# Patient Record
Sex: MALE | Race: WHITE | NOT HISPANIC OR LATINO | Employment: FULL TIME | URBAN - NONMETROPOLITAN AREA
[De-identification: names, ages, dates, MRNs, and addresses within clinical notes are randomized per-mention and may not be internally consistent; named-entity substitution may affect disease eponyms.]

---

## 2019-12-24 ENCOUNTER — OFFICE VISIT (OUTPATIENT)
Dept: URGENT CARE | Facility: PHYSICIAN GROUP | Age: 59
End: 2019-12-24
Payer: COMMERCIAL

## 2019-12-24 ENCOUNTER — APPOINTMENT (OUTPATIENT)
Dept: RADIOLOGY | Facility: IMAGING CENTER | Age: 59
End: 2019-12-24
Attending: PHYSICIAN ASSISTANT
Payer: COMMERCIAL

## 2019-12-24 VITALS
SYSTOLIC BLOOD PRESSURE: 132 MMHG | RESPIRATION RATE: 16 BRPM | TEMPERATURE: 97.8 F | OXYGEN SATURATION: 98 % | HEART RATE: 76 BPM | WEIGHT: 249 LBS | BODY MASS INDEX: 33.77 KG/M2 | DIASTOLIC BLOOD PRESSURE: 76 MMHG

## 2019-12-24 DIAGNOSIS — S69.91XA INJURY OF RIGHT HAND, INITIAL ENCOUNTER: ICD-10-CM

## 2019-12-24 DIAGNOSIS — Z23 NEED FOR VACCINATION: ICD-10-CM

## 2019-12-24 DIAGNOSIS — S63.501A SPRAIN OF RIGHT WRIST, INITIAL ENCOUNTER: Primary | ICD-10-CM

## 2019-12-24 PROCEDURE — 73110 X-RAY EXAM OF WRIST: CPT | Mod: TC,FY,RT | Performed by: PHYSICIAN ASSISTANT

## 2019-12-24 PROCEDURE — 90471 IMMUNIZATION ADMIN: CPT | Performed by: PHYSICIAN ASSISTANT

## 2019-12-24 PROCEDURE — 73130 X-RAY EXAM OF HAND: CPT | Mod: TC,FY,RT | Performed by: PHYSICIAN ASSISTANT

## 2019-12-24 PROCEDURE — 99204 OFFICE O/P NEW MOD 45 MIN: CPT | Mod: 25 | Performed by: PHYSICIAN ASSISTANT

## 2019-12-24 PROCEDURE — 90686 IIV4 VACC NO PRSV 0.5 ML IM: CPT | Performed by: PHYSICIAN ASSISTANT

## 2019-12-24 ASSESSMENT — ENCOUNTER SYMPTOMS
DIARRHEA: 0
NUMBNESS: 0
COUGH: 0
TINGLING: 0
ABDOMINAL PAIN: 0
NAUSEA: 0
FEVER: 0
MUSCLE WEAKNESS: 0
CONSTIPATION: 0
VOMITING: 0
CHILLS: 0
SHORTNESS OF BREATH: 0

## 2019-12-24 NOTE — PROGRESS NOTES
Subjective:   Woody Valdovinos is a 59 y.o. male who presents for Wrist Pain ((R) wrist, moving wrist up or down is extremely painful but can move it side to side, dislocated thumb )        Wrist Injury    The incident occurred 12 to 24 hours ago. The incident occurred at home. The injury mechanism was twisted. Pain location: R wrist  The pain is moderate. Pertinent negatives include no muscle weakness, numbness or tingling. The symptoms are aggravated by movement, palpation and lifting. He has tried ice and immobilization for the symptoms. The treatment provided mild relief.     Pt states he was installing steps when the drill twisted his R hand causing his R thumb to dislocate. He was able to reduce the thumb immediately. He is now experiencing constant pain to the R wrist.     Review of Systems   Constitutional: Negative for chills, fever and malaise/fatigue.   Respiratory: Negative for cough and shortness of breath.    Gastrointestinal: Negative for abdominal pain, constipation, diarrhea, nausea and vomiting.   Musculoskeletal: Positive for joint pain (R wrist ).   Neurological: Negative for tingling and numbness.   All other systems reviewed and are negative.      PMH:  has a past medical history of Hyperlipidemia, mild (7/24/2012), Hypertension (7/24/2012), Hypothyroidism (7/24/2012), Nonspecific abnormal electrocardiogram (ECG) (EKG) (7/24/2012), Obese (7/24/2012), and Snoring (7/24/2012).  MEDS:   Current Outpatient Medications:   •  losartan (COZAAR) 100 MG TABS, Take 100 mg by mouth every day., Disp: , Rfl:   •  olmesartan (BENICAR) 20 MG TABS, Take 20 mg by mouth every day., Disp: , Rfl:   •  TESTOSTERONE, by Does not apply route., Disp: , Rfl:   •  albuterol (PROVENTIL) 2.5 mg/0.5 mL NEBU, by Nebulization route., Disp: , Rfl:   •  levothyroxine (SYNTHROID) 137 MCG TABS, Take 137 mcg by mouth every day., Disp: , Rfl:   ALLERGIES: No Known Allergies  SURGHX:   Past Surgical History:   Procedure Laterality  Date   • FOOT SURGERY       SOCHX:  reports that he has never smoked. He has never used smokeless tobacco. He reports current alcohol use. He reports that he does not use drugs.  Family History   Problem Relation Age of Onset   • Heart Attack Father         Objective:   /76   Pulse 76   Temp 36.6 °C (97.8 °F)   Resp 16   Wt 112.9 kg (249 lb)   SpO2 98%   BMI 33.77 kg/m²     Physical Exam  Vitals signs reviewed.   Constitutional:       General: He is not in acute distress.     Appearance: He is well-developed.   HENT:      Head: Normocephalic and atraumatic.      Right Ear: External ear normal.      Left Ear: External ear normal.      Nose: Nose normal.      Mouth/Throat:      Mouth: Mucous membranes are moist.   Eyes:      Conjunctiva/sclera: Conjunctivae normal.      Pupils: Pupils are equal, round, and reactive to light.   Neck:      Musculoskeletal: Normal range of motion and neck supple.      Trachea: No tracheal deviation.   Cardiovascular:      Rate and Rhythm: Normal rate and regular rhythm.   Pulmonary:      Effort: Pulmonary effort is normal.      Breath sounds: Normal breath sounds.   Musculoskeletal:      Right wrist: He exhibits decreased range of motion, tenderness and bony tenderness. He exhibits no swelling.   Skin:     General: Skin is warm and dry.      Capillary Refill: Capillary refill takes less than 2 seconds.   Neurological:      General: No focal deficit present.      Mental Status: He is alert and oriented to person, place, and time.   Psychiatric:         Mood and Affect: Mood normal.         Behavior: Behavior normal.             Hand XR FINDINGS:  The alignment is normal.  The bone mineralization is within normal limits.  There is no acute fracture or dislocation.     There is no radio-opaque foreign body.     There is no significant degenerative change.     IMPRESSION:     Unremarkable hand series.    Wrist XR FINDINGS:  The alignment is normal.  The bone mineralization is  within normal limits.  There is no acute fracture or dislocation.     There is no radio-opaque foreign body.     There is no significant degenerative change.     IMPRESSION:     Unremarkable wrist series.    Assessment/Plan:     1. Sprain of right wrist, initial encounter     2. Injury of right hand, initial encounter  DX-HAND 3+ RIGHT    DX-WRIST-COMPLETE 3+ RIGHT   3. Need for vaccination  Influenza Vaccine Quad Injection (PF)     Per my read, no fracture seen on x-ray.  Agree with radiology report. Continue to wear wrist brace. Pt directed RICE and OTC Ibuprofen/Naproxen as needed for pain. Educational handout on wrist sprain provided.    Follow-up with primary care provider within 7-10 days.  If symptoms worsen or persist patient can return to clinic for reevaluation. All side effects of medication discussed including allergic response, GI upset, tendon injury, etc. Patient confirmed understanding of information.    Please note that this dictation was created using voice recognition software. I have made every reasonable attempt to correct obvious errors, but I expect that there are errors of grammar and possibly content that I did not discover before finalizing the note.

## 2019-12-24 NOTE — PATIENT INSTRUCTIONS
Wrist Sprain, Adult  A wrist sprain is a stretch or tear in the strong, fibrous tissues (ligaments) that connect your wrist bones. There are three types of wrist sprains:  · Grade 1. In this type of sprain, the ligament is stretched more than normal.  · Grade 2. In this type of sprain, the ligament is partially torn. You may be able to move your wrist, but not very much.  · Grade 3. In this type of sprain, the ligament or muscle is completely torn. You may find it difficult or extremely painful to move your wrist even a little.  What are the causes?  A wrist sprain can be caused by using the wrist too much during sports, exercise, or at work. It can also happen with a fall or during an accident.  What increases the risk?  This condition is more likely to occur in people:  · With a previous wrist or arm injury.  · With poor wrist strength and flexibility.  · Who play contact sports, such as football or soccer.  · Who play sports that may result in a fall, such as skateboarding, biking, skiing, or snowboarding.  · Who do not exercise regularly.  · Who use exercise equipment that does not fit well.  What are the signs or symptoms?  Symptoms of this condition include:  · Pain in the wrist, arm, or hand.  · Swelling or bruised skin near the wrist, hand, or arm. The skin may look yellow or kind of blue.  · Stiffness or trouble moving the hand.  · Hearing a pop or feeling a tear at the time of the injury.  · A warm feeling in the skin around the wrist.  How is this diagnosed?  This condition is diagnosed with a physical exam. Sometimes an X-ray is taken to make sure a bone did not break. If your health care provider thinks that you tore a ligament, he or she may order an MRI of your wrist.  How is this treated?  This condition is treated by resting and applying ice to your wrist. Additional treatment may include:  · Medicine for pain and inflammation.  · A splint to keep your wrist still (immobilized).  · Exercises to  strengthen and stretch your wrist.  · Surgery. This may be done if the ligament is completely torn.  Follow these instructions at home:  If you have a splint:  · Do not put pressure on any part of the splint until it is fully hardened. This may take several hours.  · Wear the splint as told by your health care provider. Remove it only as told by your health care provider.  · Loosen the splint if your fingers tingle, become numb, or turn cold and blue.  · If your splint is not waterproof:  ¨ Do not let it get wet.  ¨ Cover it with a watertight covering when you take a bath or a shower.  · Keep the splint clean.  Managing pain, stiffness, and swelling  · If directed, put ice on the injured area.  ¨ If you have a removable splint, remove it as told by your health care provider.  ¨ Put ice in a plastic bag.  ¨ Place a towel between your skin and the bag or between the splint and the bag.  ¨ Leave the ice on for 20 minutes, 2-3 times per day.  · Move your fingers often to avoid stiffness and to lessen swelling.  · Raise (elevate) the injured area above the level of your heart while you are sitting or lying down.  Activity  · Rest your wrist. Do not do things that cause pain.  · Return to your normal activities as told by your health care provider. Ask your health care provider what activities are safe for you.  · Do exercises as told by your health care provider.  General instructions  · Take over-the-counter and prescription medicines only as told by your health care provider.  · Do not use any products that contain nicotine or tobacco, such as cigarettes and e-cigarettes. These can delay healing. If you need help quitting, ask your health care provider.  · Ask your health care provider when it is safe to drive if you have a splint.  · Keep all follow-up visits as told by your health care provider. This is important.  Contact a health care provider if:  · Your pain, bruising, or swelling gets worse.  · Your skin becomes  red, gets a rash, or has open sores.  · Your pain does not get better or it gets worse.  Get help right away if:  · You have a new or sudden sharp pain in the hand, arm, or wrist.  · You have tingling or numbness in your hand.  · Your fingers turn white, very red, or cold and blue.  · You cannot move your fingers.  This information is not intended to replace advice given to you by your health care provider. Make sure you discuss any questions you have with your health care provider.  Document Released: 08/21/2015 Document Revised: 07/15/2017 Document Reviewed: 07/06/2017  [employer] Interactive Patient Education © 2017 [employer]    Wrist Sprain Rehab  Ask your health care provider which exercises are safe for you. Do exercises exactly as told by your health care provider and adjust them as directed. It is normal to feel mild stretching, pulling, tightness, or discomfort as you do these exercises, but you should stop right away if you feel sudden pain or your pain gets worse. Do not begin these exercises until told by your health care provider.  Stretching and range of motion exercises  These exercises warm up your muscles and joints and improve the movement and flexibility of your wrist. These exercises also help to relieve pain, numbness, and tingling.  Exercise A: Wrist flexion, active  1. Extend your left / right arm in front of you, and point your fingers downward.  2. If told by your health care provider, bend your left / right arm.  3. Try to bring your palm toward your forearm as far as you can without pain. You should feel a gentle stretch on the top of your forearm and wrist.  4. Hold this position for __________ seconds.  5. Slowly return to the starting position.  Repeat __________ times. Complete this exercise __________ times a day.  Exercise B: Wrist extension, active  1. Extend your left / right arm in front of you and turn your palm upward.  2. If told by your health care provider, bend your left /  "right arm.  3. Bring your palm and fingertips back so your fingers point downward. You should feel a gentle stretch on the inside of your forearm and wrist.  4. Hold this position for __________ seconds.  5. Slowly return to the starting position.  Repeat __________ times. Complete this exercise __________ times a day.  Exercise C: Supination, active  1. Stand or sit with your arms at your sides.  2. Bend your left / right elbow to an \"L\" shape (90 degrees).  3. Turn your palm upward until you feel a gentle stretch in the inside of your forearm.  4. Hold this position for __________ seconds.  5. Slowly return your palm to the starting position.  Repeat __________ times. Complete this stretch __________ times a day.  Exercise D: Pronation, active  1. Stand or sit with your arms at your sides.  2. Bend your left / right elbow to an \"L\" shape (90 degrees).  3. Turn your palm downward until you feel a gentle stretch in the top of your forearm.  4. Hold this position for __________ seconds.  5. Slowly return your palm to the starting position.  Repeat __________ times. Complete this stretch __________ times a day.  Strengthening exercises  These exercises build strength and endurance in your wrist. Endurance is the ability to use your muscles for a long time, even after they get tired.  Exercise E: Wrist flexors  1. Sit with your left / right forearm supported on a table and your hand resting palm-up over the edge of the table. Your elbow should be below the level of your shoulder.  2. Hold a __________ weight in your left / right hand. Or, hold a rubber exercise band or tube in both hands, keeping your hands at the same level and hip distance apart. There should be a slight tension in the exercise band or tube.  3. Slowly curl your hand up toward your forearm.  4. Hold this position for __________ seconds.  5. Slowly lower your hand back to the starting position.  Repeat __________ times. Complete this exercise " __________ times a day.  Exercise F: Wrist extensors  1. Sit with your left / right forearm supported on a table and your hand resting palm-down over the edge of the table. Your elbow should be below the level of your shoulder.  2. Hold a __________ weight in your left / right hand. Or, hold a rubber exercise band or tube in both hands, keeping your hands at the same level and hip distance apart. There should be a slight tension in the exercise band or tube.  3. Slowly curl your hand up toward your forearm.  4. Hold this position for __________ seconds.  5. Slowly lower your hand to the starting position.  Repeat __________ times. Complete this exercise __________ times a day.  This information is not intended to replace advice given to you by your health care provider. Make sure you discuss any questions you have with your health care provider.  Document Released: 12/18/2006 Document Revised: 08/23/2017 Document Reviewed: 09/03/2016  [employer] Interactive Patient Education © 2017 [employer]

## 2021-04-12 ENCOUNTER — APPOINTMENT (OUTPATIENT)
Dept: RADIOLOGY | Facility: IMAGING CENTER | Age: 61
End: 2021-04-12
Attending: PHYSICIAN ASSISTANT
Payer: COMMERCIAL

## 2021-04-12 ENCOUNTER — OFFICE VISIT (OUTPATIENT)
Dept: URGENT CARE | Facility: PHYSICIAN GROUP | Age: 61
End: 2021-04-12
Payer: COMMERCIAL

## 2021-04-12 VITALS
RESPIRATION RATE: 14 BRPM | BODY MASS INDEX: 30.94 KG/M2 | OXYGEN SATURATION: 98 % | TEMPERATURE: 98.3 F | HEIGHT: 71 IN | WEIGHT: 221 LBS | HEART RATE: 98 BPM | DIASTOLIC BLOOD PRESSURE: 96 MMHG | SYSTOLIC BLOOD PRESSURE: 144 MMHG

## 2021-04-12 DIAGNOSIS — R10.84 DIFFUSE ABDOMINAL PAIN: ICD-10-CM

## 2021-04-12 DIAGNOSIS — R10.32 LEFT LOWER QUADRANT ABDOMINAL PAIN: ICD-10-CM

## 2021-04-12 DIAGNOSIS — R10.32 LLQ PAIN: ICD-10-CM

## 2021-04-12 PROCEDURE — 74019 RADEX ABDOMEN 2 VIEWS: CPT | Mod: TC,FY | Performed by: PHYSICIAN ASSISTANT

## 2021-04-12 PROCEDURE — 99204 OFFICE O/P NEW MOD 45 MIN: CPT | Performed by: PHYSICIAN ASSISTANT

## 2021-04-12 RX ORDER — CIPROFLOXACIN 500 MG/1
500 TABLET, FILM COATED ORAL 2 TIMES DAILY
Qty: 20 TABLET | Refills: 0 | Status: SHIPPED | OUTPATIENT
Start: 2021-04-12 | End: 2021-04-22

## 2021-04-12 RX ORDER — METRONIDAZOLE 500 MG/1
500 TABLET ORAL 3 TIMES DAILY
Qty: 30 TABLET | Refills: 0 | Status: SHIPPED | OUTPATIENT
Start: 2021-04-12 | End: 2021-04-22

## 2021-04-12 NOTE — PROGRESS NOTES
Chief Complaint   Patient presents with   • Bloated     x4-5days   • Back Pain     Mid back pain radiating left side        HISTORY OF PRESENT ILLNESS: Patient is a 60 y.o. male who presents today for the following:    Patient comes in for evaluation of left lower quadrant pain for the last month.  He states it has been radiating to his left upper quadrant and around to his left side and left flank.  His bowels have been fairly normal just a little bit softer than normal and has moved his bowels twice today rather than his usual once a day.  He denies fever, chills, body aches, nausea, vomiting.  He has not any blood in his stool.  He has no history of tobacco use.  He does have a history of renal stones.  He reports a history of diverticulitis that was diagnosed on exam, without CT evaluation, but felt better with antibiotics.    Patient Active Problem List    Diagnosis Date Noted   • Hypertension 07/24/2012   • Hyperlipidemia, mild 07/24/2012   • Obese 07/24/2012   • Nonspecific abnormal electrocardiogram (ECG) (EKG) 07/24/2012   • Snoring 07/24/2012   • Hypothyroidism 07/24/2012       Allergies:Patient has no known allergies.    Current Outpatient Medications Ordered in Epic   Medication Sig Dispense Refill   • ciprofloxacin (CIPRO) 500 MG Tab Take 1 tablet by mouth 2 times a day for 10 days. TAKE FOR AT LEAST 7 DAYS 20 tablet 0   • metroNIDAZOLE (FLAGYL) 500 MG Tab Take 1 tablet by mouth 3 times a day for 10 days. TAKE FOR AT LEAST 7 DAYS 30 tablet 0     No current Epic-ordered facility-administered medications on file.       Past Medical History:   Diagnosis Date   • Hyperlipidemia, mild 7/24/2012   • Hypertension 7/24/2012   • Hypothyroidism 7/24/2012   • Nonspecific abnormal electrocardiogram (ECG) (EKG) 7/24/2012   • Obese 7/24/2012   • Snoring 7/24/2012       Social History     Tobacco Use   • Smoking status: Never Smoker   • Smokeless tobacco: Never Used   Substance Use Topics   • Alcohol use: Yes      "Comment: occasional   • Drug use: No       Family Status   Relation Name Status   • Mo  Alive   • Fa   at age 62        MI at age 52.   from MI at age 62.     Family History   Problem Relation Age of Onset   • Heart Attack Father        Review of Systems:    Constitutional ROS: No unexpected change in weight, No weakness, No fatigue  Pulmonary ROS: No chronic cough, sputum, or hemoptysis, No dyspnea on exertion, No wheezing  Cardiovascular ROS: No diaphoresis, No edema, No palpitations  Abdomen: Positive for abdominal pain.  Hematologic/Lymphatic ROS: No chills, No night sweats, No weight loss  Skin/Integumentary ROS: No edema, No evidence of rash, No itching    Exam:  /96   Pulse 98   Temp 36.8 °C (98.3 °F) (Temporal)   Resp 14   Ht 1.803 m (5' 11\")   Wt 100 kg (221 lb)   SpO2 98%   General: Well developed, well nourished. No distress.    HENT: Head is grossly normal.  Pulmonary: Unlabored respiratory effort.   Neurologic: Grossly nonfocal. No facial asymmetry noted.  Abdomen: Left lower quadrant tenderness without guarding or rebound.  Skin: Warm, dry, good turgor. No rashes in visible areas.   Psych: Normal mood. Alert and oriented to person, place and time.    UA: Unable to leave urine sample    2 view abdomen, per radiology:  No evidence of bowel obstruction. Nonspecific bowel gas pattern.    Assessment/Plan:  CT is unavailable at this location.  Patient lives quite a distance from the clinic.  Will start treatment for diverticulitis but patient does understand that if this does not improve he will have a CT as an outpatient and if it worsens at any point he will proceed to the emergency department for further evaluation management.  1. Diffuse abdominal pain  [id number] VIEWS   2. LLQ pain  [id number] VIEWS    ciprofloxacin (CIPRO) 500 MG Tab    metroNIDAZOLE (FLAGYL) 500 MG Tab   3. Left lower quadrant abdominal pain  CT-ABDOMEN-PELVIS WITH       "

## 2021-04-16 ENCOUNTER — HOSPITAL ENCOUNTER (OUTPATIENT)
Dept: LAB | Facility: MEDICAL CENTER | Age: 61
End: 2021-04-16
Attending: FAMILY MEDICINE
Payer: COMMERCIAL

## 2021-04-16 DIAGNOSIS — R10.84 DIFFUSE ABDOMINAL PAIN: ICD-10-CM

## 2021-04-16 PROCEDURE — 36415 COLL VENOUS BLD VENIPUNCTURE: CPT

## 2021-04-16 PROCEDURE — 82565 ASSAY OF CREATININE: CPT

## 2021-04-17 LAB — CREAT SERPL-MCNC: 1 MG/DL (ref 0.5–1.4)

## 2021-04-19 ENCOUNTER — HOSPITAL ENCOUNTER (OUTPATIENT)
Dept: RADIOLOGY | Facility: MEDICAL CENTER | Age: 61
End: 2021-04-19
Attending: PHYSICIAN ASSISTANT
Payer: COMMERCIAL

## 2021-04-19 DIAGNOSIS — R10.32 LEFT LOWER QUADRANT ABDOMINAL PAIN: ICD-10-CM

## 2021-04-19 PROCEDURE — 74177 CT ABD & PELVIS W/CONTRAST: CPT

## 2021-04-19 PROCEDURE — 700117 HCHG RX CONTRAST REV CODE 255: Performed by: PHYSICIAN ASSISTANT

## 2021-04-19 RX ADMIN — IOHEXOL 25 ML: 240 INJECTION, SOLUTION INTRATHECAL; INTRAVASCULAR; INTRAVENOUS; ORAL at 14:04

## 2021-04-19 RX ADMIN — IOHEXOL 100 ML: 350 INJECTION, SOLUTION INTRAVENOUS at 14:01

## 2022-06-22 ENCOUNTER — OFFICE VISIT (OUTPATIENT)
Dept: MEDICAL GROUP | Facility: CLINIC | Age: 62
End: 2022-06-22
Payer: COMMERCIAL

## 2022-06-22 VITALS
OXYGEN SATURATION: 95 % | HEIGHT: 72 IN | TEMPERATURE: 99.3 F | BODY MASS INDEX: 30.88 KG/M2 | HEART RATE: 85 BPM | DIASTOLIC BLOOD PRESSURE: 100 MMHG | WEIGHT: 228 LBS | SYSTOLIC BLOOD PRESSURE: 144 MMHG | RESPIRATION RATE: 16 BRPM

## 2022-06-22 DIAGNOSIS — L91.8 SKIN TAG: ICD-10-CM

## 2022-06-22 DIAGNOSIS — E78.5 HYPERLIPIDEMIA, MILD: ICD-10-CM

## 2022-06-22 DIAGNOSIS — I10 HYPERTENSION, UNSPECIFIED TYPE: ICD-10-CM

## 2022-06-22 DIAGNOSIS — E66.09 CLASS 1 OBESITY DUE TO EXCESS CALORIES WITHOUT SERIOUS COMORBIDITY WITH BODY MASS INDEX (BMI) OF 31.0 TO 31.9 IN ADULT: ICD-10-CM

## 2022-06-22 DIAGNOSIS — E03.9 HYPOTHYROIDISM, UNSPECIFIED TYPE: ICD-10-CM

## 2022-06-22 DIAGNOSIS — Z13.21 ENCOUNTER FOR VITAMIN DEFICIENCY SCREENING: ICD-10-CM

## 2022-06-22 PROCEDURE — 17110 DESTRUCTION B9 LES UP TO 14: CPT | Performed by: PHYSICIAN ASSISTANT

## 2022-06-22 PROCEDURE — 99204 OFFICE O/P NEW MOD 45 MIN: CPT | Mod: 25 | Performed by: PHYSICIAN ASSISTANT

## 2022-06-22 RX ORDER — LOSARTAN POTASSIUM 25 MG/1
25 TABLET ORAL DAILY
Qty: 30 TABLET | Refills: 2 | Status: SHIPPED | OUTPATIENT
Start: 2022-06-22 | End: 2022-07-15

## 2022-06-22 RX ORDER — CEPHALEXIN 250 MG/1
250 CAPSULE ORAL 3 TIMES DAILY
Qty: 30 CAPSULE | Refills: 0 | Status: SHIPPED | OUTPATIENT
Start: 2022-06-22 | End: 2023-01-04

## 2022-06-22 ASSESSMENT — PATIENT HEALTH QUESTIONNAIRE - PHQ9: CLINICAL INTERPRETATION OF PHQ2 SCORE: 0

## 2022-06-22 NOTE — NON-PROVIDER
"cc:  Skin tag    Subjective:     Woody Valdovinos is a 61 y.o. male presenting for removal of skin tag    Patient presents to the office for skin tag removal. Skin tag has been present for over 20 years with no issues. 3 days ago he got it caught on his dogs collar and it has since become very swollen, dark colored and painful. He is requesting it be cut off at this time.     He has not seen a PCP for many years, he has not had routine labs for several years either. He was previously on medication for hypertension as well as thyroid medication.     Review of systems:  See above.   Denies any symptoms unless previously indicated.        Current Outpatient Medications:   •  losartan (COZAAR) 25 MG Tab, Take 1 Tablet by mouth every day., Disp: 30 Tablet, Rfl: 2  •  cephALEXin (KEFLEX) 250 MG Cap, Take 1 Capsule by mouth 3 times a day., Disp: 30 Capsule, Rfl: 0    Allergies, past medical history, past surgical history, family history, social history reviewed and updated    Objective:     Vitals: BP (!) 144/100 (BP Location: Left arm, Patient Position: Sitting, BP Cuff Size: Large adult)   Pulse 85   Temp 37.4 °C (99.3 °F) (Temporal)   Resp 16   Ht 1.816 m (5' 11.5\")   Wt 103 kg (228 lb)   SpO2 95%   BMI 31.36 kg/m²   General: Alert, pleasant, NAD  EYES:   PERRL, EOMI, no icterus or pallor.  Conjunctivae and lids normal.   HENT:  Normocephalic.  External ears normal. Tympanic membranes pearly, opaque.  No nasal drainage present.  Oropharynx non-erythematous, mucous membranes moist.  Neck supple.   No thyromegaly or masses palpated. No cervical or supraclavicular lymphadenopathy.  Heart: Regular rate and rhythm.  S1 and S2 normal.  No murmurs appreciated.  Respiratory: Normal respiratory effort.  Clear to auscultation bilaterally.  Abdomen: Non-distended, soft, non-tender, no guarding/rebound. Bowel sounds present.  No hepatosplenomegaly.  No hernias.  Skin: Warm, dry, no rashes. Skin tag in right axilla " area  Musculoskeletal: Gait is normal.  Moves all extremities well.    Psych:  Affect/mood is normal, judgement is good, memory is intact, grooming is appropriate.    Assessment/Plan:     Woody was seen today for establish care.    Diagnoses and all orders for this visit:    Skin tag  -     cephALEXin (KEFLEX) 250 MG Cap; Take 1 Capsule by mouth 3 times a day.  -     Consent for all Surgical, Special Diagnostic or Therapeutic Procedures    Hyperlipidemia, mild  -     Lipid Profile; Future  -     Comp Metabolic Panel; Future    Hypertension, unspecified type  -     Lipid Profile; Future  -     Comp Metabolic Panel; Future  -     losartan (COZAAR) 25 MG Tab; Take 1 Tablet by mouth every day.    Hypothyroidism, unspecified type  -     TSH; Future  -     FREE THYROXINE; Future    Class 1 obesity due to excess calories without serious comorbidity with body mass index (BMI) of 31.0 to 31.9 in adult  -     Lipid Profile; Future  -     Comp Metabolic Panel; Future  -     TSH; Future  -     FREE THYROXINE; Future    Encounter for vitamin deficiency screening  -     VITAMIN D,25 HYDROXY; Future            No follow-ups on file.    [unfilled]

## 2022-06-22 NOTE — PROGRESS NOTES
"cc:  Skin tag    Subjective:     Woody Valdovinos is a 61 y.o. male presenting for skin tag      Patient presents to the office for skin tag.  Patient has a skin tag that got caught in his dogs collar.  In about 4 days, it became swollen and discolored.  He states that he was going to try to freeze it off but his wife was concerned and recommended that he establish with a primary care provider.     He has been having pain in his arm also. Patient states that he would follow up with a  Program that [facility] had and discontinued.  He states that he stopped all medication as he felt all he needed to do is lose weight.  He does have hypertension, hyperlipidemai and hypothyroidism.  He is also obese.     Review of systems:  See above.   Denies any symptoms unless previously indicated.        Current Outpatient Medications:   •  losartan (COZAAR) 25 MG Tab, Take 1 Tablet by mouth every day., Disp: 30 Tablet, Rfl: 2  •  cephALEXin (KEFLEX) 250 MG Cap, Take 1 Capsule by mouth 3 times a day., Disp: 30 Capsule, Rfl: 0    Allergies, past medical history, past surgical history, family history, social history reviewed and updated    Objective:     Vitals: BP (!) 144/100 (BP Location: Left arm, Patient Position: Sitting, BP Cuff Size: Large adult)   Pulse 85   Temp 37.4 °C (99.3 °F) (Temporal)   Resp 16   Ht 1.816 m (5' 11.5\")   Wt 103 kg (228 lb)   SpO2 95%   BMI 31.36 kg/m²   General: Alert, pleasant, NAD  EYES:   PERRL, EOMI, no icterus or pallor.  Conjunctivae and lids normal.   HENT:  Normocephalic.  External ears normal.  Neck supple.     Respiratory: Normal respiratory effort.    Abdomen:  obese  Skin: Warm, dry, no rashes. Swollen pedunculated skin tag right axilla.  Appears to have bled.    Musculoskeletal: Gait is normal.  Moves all extremities well.    Extremities: normal range of motion all extremities.   Neurological: No tremors, sensation grossly intact, CN2-12 intact.  Psych:  Affect/mood is normal, judgement is " good, memory is intact, grooming is appropriate.  Cryosurgery:  Performed by PA student while I monitored.  Anterior Ice ball at neck after 15 seconds of cryosurgery.  Posterior ice ball at neck 15 seconds of cryosurgery.    Assessment/Plan:     Woody was seen today for establish care.    Diagnoses and all orders for this visit:    Skin tag  -     cephALEXin (KEFLEX) 250 MG Cap; Take 1 Capsule by mouth 3 times a day.  -     Consent for all Surgical, Special Diagnostic or Therapeutic Procedures    Potential side effects and risks of procedure discussed with patient.  He provided verbal consent to proceed as well as written consent.  Written consent will be scanned into chart.  Patient tolerated procedure well with no issues concerns or complaints.      Hyperlipidemia, mild  -     Lipid Profile; Future  -     Comp Metabolic Panel; Future  Hypertension, unspecified type  -     Lipid Profile; Future  -     Comp Metabolic Panel; Future  -     losartan (COZAAR) 25 MG Tab; Take 1 Tablet by mouth every day.  Hypothyroidism, unspecified type  -     TSH; Future  -     FREE THYROXINE; Future  Class 1 obesity due to excess calories without serious comorbidity with body mass index (BMI) of 31.0 to 31.9 in adult  -     Lipid Profile; Future  -     Comp Metabolic Panel; Future  -     TSH; Future  -     FREE THYROXINE; Future  Encounter for vitamin deficiency screening  -     VITAMIN D,25 HYDROXY; Future    Labs have been ordered to evaluate further.  We have also started patient on blood pressure medication as blood pressure is elevated.  Follow-up in 4 to 8 weeks, sooner if needed.        No follow-ups on file.    Please note that this dictation was created using voice recognition software. I have made every reasonable attempt to correct obvious errors, but expect that there are errors of grammar and possible content that I did not discover before finalizing note.

## 2022-11-08 ENCOUNTER — HOSPITAL ENCOUNTER (OUTPATIENT)
Facility: MEDICAL CENTER | Age: 62
End: 2022-11-08
Attending: PHYSICIAN ASSISTANT
Payer: COMMERCIAL

## 2022-11-08 ENCOUNTER — NON-PROVIDER VISIT (OUTPATIENT)
Dept: MEDICAL GROUP | Facility: CLINIC | Age: 62
End: 2022-11-08
Payer: COMMERCIAL

## 2022-11-08 DIAGNOSIS — E78.5 HYPERLIPIDEMIA, MILD: ICD-10-CM

## 2022-11-08 DIAGNOSIS — I10 HYPERTENSION, UNSPECIFIED TYPE: ICD-10-CM

## 2022-11-08 DIAGNOSIS — E03.9 HYPOTHYROIDISM, UNSPECIFIED TYPE: ICD-10-CM

## 2022-11-08 DIAGNOSIS — Z13.21 ENCOUNTER FOR VITAMIN DEFICIENCY SCREENING: ICD-10-CM

## 2022-11-08 DIAGNOSIS — E66.09 CLASS 1 OBESITY DUE TO EXCESS CALORIES WITHOUT SERIOUS COMORBIDITY WITH BODY MASS INDEX (BMI) OF 31.0 TO 31.9 IN ADULT: ICD-10-CM

## 2022-11-08 PROCEDURE — 84443 ASSAY THYROID STIM HORMONE: CPT

## 2022-11-08 PROCEDURE — 84439 ASSAY OF FREE THYROXINE: CPT

## 2022-11-08 PROCEDURE — 36415 COLL VENOUS BLD VENIPUNCTURE: CPT | Performed by: PHYSICIAN ASSISTANT

## 2022-11-08 PROCEDURE — 80053 COMPREHEN METABOLIC PANEL: CPT

## 2022-11-08 PROCEDURE — 80061 LIPID PANEL: CPT

## 2022-11-08 PROCEDURE — 82306 VITAMIN D 25 HYDROXY: CPT

## 2022-11-09 LAB
25(OH)D3 SERPL-MCNC: 21 NG/ML (ref 30–100)
ALBUMIN SERPL BCP-MCNC: 4.4 G/DL (ref 3.2–4.9)
ALBUMIN/GLOB SERPL: 1.8 G/DL
ALP SERPL-CCNC: 68 U/L (ref 30–99)
ALT SERPL-CCNC: 34 U/L (ref 2–50)
ANION GAP SERPL CALC-SCNC: 9 MMOL/L (ref 7–16)
AST SERPL-CCNC: 24 U/L (ref 12–45)
BILIRUB SERPL-MCNC: 0.7 MG/DL (ref 0.1–1.5)
BUN SERPL-MCNC: 12 MG/DL (ref 8–22)
CALCIUM SERPL-MCNC: 8.7 MG/DL (ref 8.5–10.5)
CHLORIDE SERPL-SCNC: 105 MMOL/L (ref 96–112)
CHOLEST SERPL-MCNC: 184 MG/DL (ref 100–199)
CO2 SERPL-SCNC: 24 MMOL/L (ref 20–33)
CREAT SERPL-MCNC: 0.98 MG/DL (ref 0.5–1.4)
GFR SERPLBLD CREATININE-BSD FMLA CKD-EPI: 87 ML/MIN/1.73 M 2
GLOBULIN SER CALC-MCNC: 2.5 G/DL (ref 1.9–3.5)
GLUCOSE SERPL-MCNC: 116 MG/DL (ref 65–99)
HDLC SERPL-MCNC: 41 MG/DL
LDLC SERPL CALC-MCNC: 113 MG/DL
POTASSIUM SERPL-SCNC: 4.4 MMOL/L (ref 3.6–5.5)
PROT SERPL-MCNC: 6.9 G/DL (ref 6–8.2)
SODIUM SERPL-SCNC: 138 MMOL/L (ref 135–145)
T4 FREE SERPL-MCNC: 0.75 NG/DL (ref 0.93–1.7)
TRIGL SERPL-MCNC: 151 MG/DL (ref 0–149)
TSH SERPL DL<=0.005 MIU/L-ACNC: 9.94 UIU/ML (ref 0.38–5.33)

## 2022-11-10 ENCOUNTER — OFFICE VISIT (OUTPATIENT)
Dept: MEDICAL GROUP | Facility: CLINIC | Age: 62
End: 2022-11-10
Payer: COMMERCIAL

## 2022-11-10 VITALS
HEIGHT: 72 IN | DIASTOLIC BLOOD PRESSURE: 100 MMHG | OXYGEN SATURATION: 97 % | BODY MASS INDEX: 30.88 KG/M2 | WEIGHT: 228 LBS | RESPIRATION RATE: 18 BRPM | HEART RATE: 83 BPM | SYSTOLIC BLOOD PRESSURE: 150 MMHG | TEMPERATURE: 98.6 F

## 2022-11-10 DIAGNOSIS — E55.9 VITAMIN D DEFICIENCY: ICD-10-CM

## 2022-11-10 DIAGNOSIS — Z23 NEED FOR VACCINATION: ICD-10-CM

## 2022-11-10 DIAGNOSIS — I10 HYPERTENSION, UNSPECIFIED TYPE: ICD-10-CM

## 2022-11-10 DIAGNOSIS — E78.5 HYPERLIPIDEMIA, MILD: ICD-10-CM

## 2022-11-10 DIAGNOSIS — G25.0 BENIGN ESSENTIAL TREMOR: ICD-10-CM

## 2022-11-10 DIAGNOSIS — E03.9 HYPOTHYROIDISM, UNSPECIFIED TYPE: ICD-10-CM

## 2022-11-10 PROCEDURE — 99214 OFFICE O/P EST MOD 30 MIN: CPT | Performed by: PHYSICIAN ASSISTANT

## 2022-11-10 RX ORDER — LEVOTHYROXINE SODIUM 0.05 MG/1
50 TABLET ORAL
Qty: 90 TABLET | Refills: 1 | Status: SHIPPED | OUTPATIENT
Start: 2022-11-10 | End: 2023-01-04

## 2022-11-10 RX ORDER — LOSARTAN POTASSIUM 50 MG/1
50 TABLET ORAL DAILY
Qty: 90 TABLET | Refills: 2 | Status: SHIPPED | OUTPATIENT
Start: 2022-11-10 | End: 2023-01-04 | Stop reason: SDUPTHER

## 2022-11-10 RX ORDER — ERGOCALCIFEROL 1.25 MG/1
50000 CAPSULE ORAL
Qty: 15 CAPSULE | Refills: 2 | Status: SHIPPED | OUTPATIENT
Start: 2022-11-10 | End: 2023-11-22 | Stop reason: SDUPTHER

## 2022-11-10 RX ORDER — PROPRANOLOL HYDROCHLORIDE 20 MG/1
20 TABLET ORAL 2 TIMES DAILY
Qty: 180 TABLET | Refills: 0 | Status: SHIPPED | OUTPATIENT
Start: 2022-11-10 | End: 2023-02-03

## 2022-11-10 NOTE — PROGRESS NOTES
cc: thyroid     Subjective:     Woody Valdovinos is a 62 y.o. male presenting for thyroid    Patient presents to the office for thyroid.  Patient presents the office today to discuss his most recent test results.  He does have a history of hypothyroidism and has been off of medication for approximately 5 to 6 years.  He has noticed some difficulty with weight loss and increased weight gain.  He is willing to start thyroid medication again at this time.  In the meantime he is also here to discuss his hypertension, hyperlipidemia and vitamin D deficiency.  He does indicate that he is taking about 5000 units of vitamin D on a daily basis.    Patient is struggling with a tremor in his right hand.  He states that keeping it moving does help.  His hand has a greater tremor at rest or drinking coffee.  He also notices that it improves with alcohol use.  The tremor is a significant issue for him and he would like to do what he can to decrease his tremor.    Review of systems:  See above.   Denies any symptoms unless previously indicated.        Current Outpatient Medications:     levothyroxine (SYNTHROID) 50 MCG Tab, Take 1 Tablet by mouth every morning on an empty stomach., Disp: 90 Tablet, Rfl: 1    vitamin D2, Ergocalciferol, (DRISDOL) 1.25 MG (10391 UT) Cap capsule, Take 1 Capsule by mouth every 7 days., Disp: 15 Capsule, Rfl: 2    propranolol (INDERAL) 20 MG Tab, Take 1 Tablet by mouth 2 times a day., Disp: 180 Tablet, Rfl: 0    losartan (COZAAR) 50 MG Tab, Take 1 Tablet by mouth every day., Disp: 90 Tablet, Rfl: 2    cephALEXin (KEFLEX) 250 MG Cap, Take 1 Capsule by mouth 3 times a day. (Patient not taking: Reported on 11/10/2022), Disp: 30 Capsule, Rfl: 0    Allergies, past medical history, past surgical history, family history, social history reviewed and updated    Objective:     Vitals: BP (!) 150/100 (BP Location: Left arm, Patient Position: Sitting, BP Cuff Size: Adult long)   Pulse 83   Temp 37 °C (98.6 °F)  "(Temporal)   Resp 18   Ht 1.816 m (5' 11.5\")   Wt 103 kg (228 lb)   SpO2 97%   BMI 31.36 kg/m²   General: Alert, pleasant, NAD  EYES:   PERRL, EOMI, no icterus or pallor.  Conjunctivae and lids normal.   HENT:  Normocephalic.  External ears normal.  Neck supple.     Respiratory: Normal respiratory effort.    Abdomen: obese  Skin: Warm, dry, no rashes.  Musculoskeletal: Gait is normal.  Moves all extremities well.    Extremities: Tremor right hand.   Neurological: sensation grossly intact,  CN2-12 intact.  Psych:  Affect/mood is normal, judgement is good, memory is intact, grooming is appropriate.    Assessment/Plan:     Woody was seen today for results and hypertension.    Diagnoses and all orders for this visit:    Hypothyroidism, unspecified type  -     levothyroxine (SYNTHROID) 50 MCG Tab; Take 1 Tablet by mouth every morning on an empty stomach.  -     TSH WITH REFLEX TO FT4; Future    Not controlled.  We will start patient on levothyroxine 50 mcg daily and repeat labs in 6 to 8 weeks.  Will most likely need to follow-up with dose adjustments at this time.    Hypertension, unspecified type  -     losartan (COZAAR) 50 MG Tab; Take 1 Tablet by mouth every day.    Not controlled.  Will increase losartan from 25 to 50 mg daily.  Propranolol will be added for tremor but will most likely help with blood pressure as well at 20 mg twice a day.    Hyperlipidemia, mild    Patient to continue to work with improved diet and exercise.    Vitamin D deficiency  -     vitamin D2, Ergocalciferol, (DRISDOL) 1.25 MG (61507 UT) Cap capsule; Take 1 Capsule by mouth every 7 days.    Not controlled.  We will switch patient to 50,000 units weekly of vitamin D to see if we are able to obtain better control of the lower levels.    Benign essential tremor  -     propranolol (INDERAL) 20 MG Tab; Take 1 Tablet by mouth 2 times a day.    Tremors significant for patient.  We will start Propranolol 20 mg twice a day and follow-up in 6 to " 8 weeks.    Need for vaccination  -     INFLUENZA VACCINE QUAD INJ (PF)    Deferred at this time.  Neglected to provide before patient left office.      Return in about 6 weeks (around 12/22/2022), or if symptoms worsen or fail to improve, for 6-8 weeks.    Please note that this dictation was created using voice recognition software. I have made every reasonable attempt to correct obvious errors, but expect that there are errors of grammar and possible content that I did not discover before finalizing note.

## 2022-12-27 ENCOUNTER — HOSPITAL ENCOUNTER (OUTPATIENT)
Facility: MEDICAL CENTER | Age: 62
End: 2022-12-27
Attending: PHYSICIAN ASSISTANT
Payer: COMMERCIAL

## 2022-12-27 ENCOUNTER — NON-PROVIDER VISIT (OUTPATIENT)
Dept: MEDICAL GROUP | Facility: CLINIC | Age: 62
End: 2022-12-27
Payer: COMMERCIAL

## 2022-12-27 DIAGNOSIS — E03.9 HYPOTHYROIDISM, UNSPECIFIED TYPE: ICD-10-CM

## 2022-12-27 PROCEDURE — 84439 ASSAY OF FREE THYROXINE: CPT

## 2022-12-27 PROCEDURE — 84443 ASSAY THYROID STIM HORMONE: CPT

## 2022-12-27 PROCEDURE — 36415 COLL VENOUS BLD VENIPUNCTURE: CPT | Performed by: PHYSICIAN ASSISTANT

## 2022-12-28 LAB
T4 FREE SERPL-MCNC: 1.03 NG/DL (ref 0.93–1.7)
TSH SERPL DL<=0.005 MIU/L-ACNC: 5.83 UIU/ML (ref 0.38–5.33)

## 2023-01-04 ENCOUNTER — OFFICE VISIT (OUTPATIENT)
Dept: MEDICAL GROUP | Facility: CLINIC | Age: 63
End: 2023-01-04
Payer: COMMERCIAL

## 2023-01-04 VITALS
RESPIRATION RATE: 18 BRPM | BODY MASS INDEX: 30.5 KG/M2 | SYSTOLIC BLOOD PRESSURE: 130 MMHG | TEMPERATURE: 97.7 F | WEIGHT: 225.2 LBS | HEART RATE: 81 BPM | OXYGEN SATURATION: 95 % | DIASTOLIC BLOOD PRESSURE: 84 MMHG | HEIGHT: 72 IN

## 2023-01-04 DIAGNOSIS — R10.12 LEFT UPPER QUADRANT ABDOMINAL PAIN: ICD-10-CM

## 2023-01-04 DIAGNOSIS — Z23 NEED FOR VACCINATION: ICD-10-CM

## 2023-01-04 DIAGNOSIS — I10 HYPERTENSION, UNSPECIFIED TYPE: ICD-10-CM

## 2023-01-04 DIAGNOSIS — E03.9 HYPOTHYROIDISM, UNSPECIFIED TYPE: ICD-10-CM

## 2023-01-04 DIAGNOSIS — R07.89 OTHER CHEST PAIN: ICD-10-CM

## 2023-01-04 PROCEDURE — 90686 IIV4 VACC NO PRSV 0.5 ML IM: CPT | Performed by: PHYSICIAN ASSISTANT

## 2023-01-04 PROCEDURE — 93000 ELECTROCARDIOGRAM COMPLETE: CPT | Performed by: PHYSICIAN ASSISTANT

## 2023-01-04 PROCEDURE — 90471 IMMUNIZATION ADMIN: CPT | Performed by: PHYSICIAN ASSISTANT

## 2023-01-04 PROCEDURE — 99214 OFFICE O/P EST MOD 30 MIN: CPT | Mod: 25 | Performed by: PHYSICIAN ASSISTANT

## 2023-01-04 RX ORDER — LOSARTAN POTASSIUM 50 MG/1
TABLET ORAL
Qty: 135 TABLET | Refills: 1 | Status: SHIPPED | OUTPATIENT
Start: 2023-01-04 | End: 2023-06-15

## 2023-01-04 RX ORDER — LEVOTHYROXINE SODIUM 0.07 MG/1
75 TABLET ORAL
Qty: 90 TABLET | Refills: 1 | Status: SHIPPED | OUTPATIENT
Start: 2023-01-04 | End: 2023-06-15

## 2023-01-04 ASSESSMENT — PATIENT HEALTH QUESTIONNAIRE - PHQ9: CLINICAL INTERPRETATION OF PHQ2 SCORE: 0

## 2023-01-04 NOTE — PROGRESS NOTES
cc:  abdominal pain    Subjective:     Woody Valdovinos is a 62 y.o. male presenting for abdominal pain      Patient presents to the office for abdominal pain.  Patient was putting up rafters on his shed and started to have abdominal pain.  He states that it felt like something pulled.  He finished the roof but the pain persisted and then started having problems moving his bowels.  He states that he started to push on his abdomen when he was laying in bad.  He states that he pinched his belly and felt a hot poker type sensation that went from his chest to his groin.  He states that he started amoxicillin that he purchased in [place].  He states that for 3 days after pinching his belly, he could not eat for 3 days.  He states that it hurts from under the umbilicus to under his ribs.  He states that the pain was initially a 12 and is now a 7.      Patient states that he was taking double of the losartan for about 1.5 weeks.  His blood pressure and pulse were running high.  He states that his blood pressure and pulse came down.  He decreased to 1.5 tabs daily and it has been stable since.     Patient had labs drawn for his thyroid and would like to have a dose revision of his thyroid medication due to abnormal labs.     Review of systems:  See above.   Denies any symptoms unless previously indicated.        Current Outpatient Medications:     losartan (COZAAR) 50 MG Tab, Take 1.5 tabs daily, Disp: 135 Tablet, Rfl: 1    levothyroxine (SYNTHROID) 75 MCG Tab, Take 1 Tablet by mouth every morning on an empty stomach., Disp: 90 Tablet, Rfl: 1    vitamin D2, Ergocalciferol, (DRISDOL) 1.25 MG (67243 UT) Cap capsule, Take 1 Capsule by mouth every 7 days., Disp: 15 Capsule, Rfl: 2    propranolol (INDERAL) 20 MG Tab, Take 1 Tablet by mouth 2 times a day., Disp: 180 Tablet, Rfl: 0    Allergies, past medical history, past surgical history, family history, social history reviewed and updated    Objective:     Vitals: /84 (BP  "Location: Left arm, Patient Position: Sitting, BP Cuff Size: Adult long)   Pulse 81   Temp 36.5 °C (97.7 °F) (Temporal)   Resp 18   Ht 1.816 m (5' 11.5\")   Wt 102 kg (225 lb 3.2 oz)   SpO2 95%   BMI 30.97 kg/m²   General: Alert, pleasant, NAD  EYES:   PERRL, EOMI, no icterus or pallor.  Conjunctivae and lids normal.   HENT:  Normocephalic.  External ears normal.  Neck supple.    Heart: Regular rate and rhythm.  S1 and S2 normal.  No murmurs appreciated.  Respiratory: Normal respiratory effort.  Clear to auscultation bilaterally.  Abdomen: Non-distended, obese, soft, tender in the left upper and lower quadrant. Bowel sounds present.  possible hernia or abnormality palpated outer middle left quadrant.  Skin: Warm, dry, no rashes.  Musculoskeletal: Gait is normal.  Moves all extremities well.    Extremities: normal range of motion all extremities.   Neurological: No tremors, sensation grossly intact, gait is normal, CN2-12 intact.  Psych:  Affect/mood is normal, judgement is good, memory is intact, grooming is appropriate.  EKG:  normal sinus, normal rhythm.  No st elevation or depression    Assessment/Plan:     Woody was seen today for abdominal pain and hypertension.    Diagnoses and all orders for this visit:    Left upper quadrant abdominal pain  -     CT-ABDOMEN-PELVIS WITH & W/O; Future  Other chest pain  -     EKG - Clinic Performed    EKG is essentially negative.  I do not believe that this is a cardiac issue.  My concern however is a hernia.  Symptoms are significant for patient.  We will order a CT stat to evaluate further and will notify patient of results when received.  If positive for hernia, we will submit a referral to general surgery.  ER precautions given.    Hypertension, unspecified type  -     losartan (COZAAR) 50 MG Tab; Take 1.5 tabs daily    Patient did better with the losartan 50 mg 1-1/2 tabs daily.  It seems as though the 100 mg dose was too strong for patient.  We will continue to " monitor blood pressure.    Hypothyroidism, unspecified type  -     levothyroxine (SYNTHROID) 75 MCG Tab; Take 1 Tablet by mouth every morning on an empty stomach.  -     TSH WITH REFLEX TO FT4; Future    New Rx submitted for levothyroxine 75 mcg daily.  We will repeat labs in 6 to 8 weeks.    Need for vaccination  -     INFLUENZA VACCINE QUAD INJ (PF)      Flu vaccine given.      No follow-ups on file.    Please note that this dictation was created using voice recognition software. I have made every reasonable attempt to correct obvious errors, but expect that there are errors of grammar and possible content that I did not discover before finalizing note.

## 2023-01-05 ENCOUNTER — HOSPITAL ENCOUNTER (OUTPATIENT)
Dept: RADIOLOGY | Facility: MEDICAL CENTER | Age: 63
End: 2023-01-05
Attending: PHYSICIAN ASSISTANT
Payer: COMMERCIAL

## 2023-01-05 DIAGNOSIS — R10.12 LEFT UPPER QUADRANT ABDOMINAL PAIN: ICD-10-CM

## 2023-01-05 PROCEDURE — 74177 CT ABD & PELVIS W/CONTRAST: CPT

## 2023-01-05 PROCEDURE — 700117 HCHG RX CONTRAST REV CODE 255: Performed by: PHYSICIAN ASSISTANT

## 2023-01-05 RX ADMIN — IOHEXOL 100 ML: 350 INJECTION, SOLUTION INTRAVENOUS at 17:00

## 2023-01-05 RX ADMIN — IOHEXOL 10 ML: 240 INJECTION, SOLUTION INTRATHECAL; INTRAVASCULAR; INTRAVENOUS; ORAL at 15:30

## 2023-11-22 DIAGNOSIS — E55.9 VITAMIN D DEFICIENCY: ICD-10-CM

## 2023-11-22 NOTE — TELEPHONE ENCOUNTER
Received request via: Patient    Was the patient seen in the last year in this department? Yes    Does the patient have an active prescription (recently filled or refills available) for medication(s) requested? No    Does the patient have long-term Plus and need 100 day supply (blood pressure, diabetes and cholesterol meds only)? Patient does not have SCP      Last Ov 1/4/23  Last Labs 12/27/22

## 2023-11-28 RX ORDER — ERGOCALCIFEROL 1.25 MG/1
50000 CAPSULE ORAL
Qty: 4 CAPSULE | Refills: 0 | Status: SHIPPED | OUTPATIENT
Start: 2023-11-28 | End: 2023-12-28

## 2023-12-27 DIAGNOSIS — E55.9 VITAMIN D DEFICIENCY: ICD-10-CM

## 2023-12-28 RX ORDER — ERGOCALCIFEROL 1.25 MG/1
50000 CAPSULE ORAL
Qty: 4 CAPSULE | Refills: 0 | Status: SHIPPED | OUTPATIENT
Start: 2023-12-28

## 2024-04-11 ENCOUNTER — APPOINTMENT (OUTPATIENT)
Dept: RADIOLOGY | Facility: MEDICAL CENTER | Age: 64
End: 2024-04-11
Attending: INTERNAL MEDICINE
Payer: COMMERCIAL

## 2024-04-11 ENCOUNTER — APPOINTMENT (OUTPATIENT)
Dept: URGENT CARE | Facility: PHYSICIAN GROUP | Age: 64
End: 2024-04-11

## 2024-04-11 ENCOUNTER — APPOINTMENT (OUTPATIENT)
Dept: CARDIOLOGY | Facility: MEDICAL CENTER | Age: 64
End: 2024-04-11
Attending: INTERNAL MEDICINE
Payer: COMMERCIAL

## 2024-04-11 ENCOUNTER — APPOINTMENT (OUTPATIENT)
Dept: RADIOLOGY | Facility: MEDICAL CENTER | Age: 64
End: 2024-04-11
Attending: EMERGENCY MEDICINE
Payer: COMMERCIAL

## 2024-04-11 ENCOUNTER — HOSPITAL ENCOUNTER (OUTPATIENT)
Facility: MEDICAL CENTER | Age: 64
End: 2024-04-11
Attending: EMERGENCY MEDICINE | Admitting: INTERNAL MEDICINE
Payer: COMMERCIAL

## 2024-04-11 VITALS
RESPIRATION RATE: 18 BRPM | BODY MASS INDEX: 31.14 KG/M2 | TEMPERATURE: 99.3 F | DIASTOLIC BLOOD PRESSURE: 84 MMHG | OXYGEN SATURATION: 92 % | SYSTOLIC BLOOD PRESSURE: 137 MMHG | WEIGHT: 222.44 LBS | HEIGHT: 71 IN | HEART RATE: 95 BPM

## 2024-04-11 DIAGNOSIS — R07.9 CHEST PAIN, UNSPECIFIED TYPE: ICD-10-CM

## 2024-04-11 LAB
ALBUMIN SERPL BCP-MCNC: 4.4 G/DL (ref 3.2–4.9)
ALBUMIN/GLOB SERPL: 1.5 G/DL
ALP SERPL-CCNC: 59 U/L (ref 30–99)
ALT SERPL-CCNC: 28 U/L (ref 2–50)
AMPHET UR QL SCN: NEGATIVE
ANION GAP SERPL CALC-SCNC: 12 MMOL/L (ref 7–16)
AST SERPL-CCNC: 27 U/L (ref 12–45)
BARBITURATES UR QL SCN: NEGATIVE
BASOPHILS # BLD AUTO: 0.9 % (ref 0–1.8)
BASOPHILS # BLD: 0.06 K/UL (ref 0–0.12)
BENZODIAZ UR QL SCN: NEGATIVE
BILIRUB SERPL-MCNC: 0.5 MG/DL (ref 0.1–1.5)
BUN SERPL-MCNC: 8 MG/DL (ref 8–22)
BZE UR QL SCN: NEGATIVE
CALCIUM ALBUM COR SERPL-MCNC: 8.5 MG/DL (ref 8.5–10.5)
CALCIUM SERPL-MCNC: 8.8 MG/DL (ref 8.5–10.5)
CANNABINOIDS UR QL SCN: NEGATIVE
CHLORIDE SERPL-SCNC: 101 MMOL/L (ref 96–112)
CHOLEST SERPL-MCNC: 165 MG/DL (ref 100–199)
CO2 SERPL-SCNC: 23 MMOL/L (ref 20–33)
CREAT SERPL-MCNC: 0.83 MG/DL (ref 0.5–1.4)
D DIMER PPP IA.FEU-MCNC: <0.27 UG/ML (FEU) (ref 0–0.5)
EKG IMPRESSION: NORMAL
EOSINOPHIL # BLD AUTO: 0.1 K/UL (ref 0–0.51)
EOSINOPHIL NFR BLD: 1.5 % (ref 0–6.9)
ERYTHROCYTE [DISTWIDTH] IN BLOOD BY AUTOMATED COUNT: 43.5 FL (ref 35.9–50)
EST. AVERAGE GLUCOSE BLD GHB EST-MCNC: 131 MG/DL
FENTANYL UR QL: NEGATIVE
GFR SERPLBLD CREATININE-BSD FMLA CKD-EPI: 98 ML/MIN/1.73 M 2
GLOBULIN SER CALC-MCNC: 2.9 G/DL (ref 1.9–3.5)
GLUCOSE SERPL-MCNC: 150 MG/DL (ref 65–99)
HBA1C MFR BLD: 6.2 % (ref 4–5.6)
HCT VFR BLD AUTO: 51.4 % (ref 42–52)
HDLC SERPL-MCNC: 48 MG/DL
HGB BLD-MCNC: 18.1 G/DL (ref 14–18)
IMM GRANULOCYTES # BLD AUTO: 0.06 K/UL (ref 0–0.11)
IMM GRANULOCYTES NFR BLD AUTO: 0.9 % (ref 0–0.9)
LDLC SERPL CALC-MCNC: 105 MG/DL
LIPASE SERPL-CCNC: 31 U/L (ref 11–82)
LV EJECT FRACT  99904: 75
LYMPHOCYTES # BLD AUTO: 1.2 K/UL (ref 1–4.8)
LYMPHOCYTES NFR BLD: 18.3 % (ref 22–41)
MAGNESIUM SERPL-MCNC: 2.1 MG/DL (ref 1.5–2.5)
MCH RBC QN AUTO: 30.7 PG (ref 27–33)
MCHC RBC AUTO-ENTMCNC: 35.2 G/DL (ref 32.3–36.5)
MCV RBC AUTO: 87.3 FL (ref 81.4–97.8)
METHADONE UR QL SCN: NEGATIVE
MONOCYTES # BLD AUTO: 0.56 K/UL (ref 0–0.85)
MONOCYTES NFR BLD AUTO: 8.5 % (ref 0–13.4)
NEUTROPHILS # BLD AUTO: 4.57 K/UL (ref 1.82–7.42)
NEUTROPHILS NFR BLD: 69.9 % (ref 44–72)
NRBC # BLD AUTO: 0 K/UL
NRBC BLD-RTO: 0 /100 WBC (ref 0–0.2)
OPIATES UR QL SCN: NEGATIVE
OXYCODONE UR QL SCN: NEGATIVE
PCP UR QL SCN: NEGATIVE
PLATELET # BLD AUTO: 182 K/UL (ref 164–446)
PMV BLD AUTO: 10.5 FL (ref 9–12.9)
POTASSIUM SERPL-SCNC: 4.1 MMOL/L (ref 3.6–5.5)
PROPOXYPH UR QL SCN: NEGATIVE
PROT SERPL-MCNC: 7.3 G/DL (ref 6–8.2)
RBC # BLD AUTO: 5.89 M/UL (ref 4.7–6.1)
SODIUM SERPL-SCNC: 136 MMOL/L (ref 135–145)
TRIGL SERPL-MCNC: 58 MG/DL (ref 0–149)
TROPONIN T SERPL-MCNC: <6 NG/L (ref 6–19)
WBC # BLD AUTO: 6.6 K/UL (ref 4.8–10.8)

## 2024-04-11 PROCEDURE — 83735 ASSAY OF MAGNESIUM: CPT

## 2024-04-11 PROCEDURE — 76705 ECHO EXAM OF ABDOMEN: CPT

## 2024-04-11 PROCEDURE — 93005 ELECTROCARDIOGRAM TRACING: CPT

## 2024-04-11 PROCEDURE — 99223 1ST HOSP IP/OBS HIGH 75: CPT | Performed by: INTERNAL MEDICINE

## 2024-04-11 PROCEDURE — 700111 HCHG RX REV CODE 636 W/ 250 OVERRIDE (IP): Mod: JZ | Performed by: EMERGENCY MEDICINE

## 2024-04-11 PROCEDURE — 96375 TX/PRO/DX INJ NEW DRUG ADDON: CPT

## 2024-04-11 PROCEDURE — 93005 ELECTROCARDIOGRAM TRACING: CPT | Performed by: INTERNAL MEDICINE

## 2024-04-11 PROCEDURE — 71045 X-RAY EXAM CHEST 1 VIEW: CPT

## 2024-04-11 PROCEDURE — 80307 DRUG TEST PRSMV CHEM ANLYZR: CPT

## 2024-04-11 PROCEDURE — 93010 ELECTROCARDIOGRAM REPORT: CPT | Performed by: INTERNAL MEDICINE

## 2024-04-11 PROCEDURE — 96374 THER/PROPH/DIAG INJ IV PUSH: CPT

## 2024-04-11 PROCEDURE — G0378 HOSPITAL OBSERVATION PER HR: HCPCS

## 2024-04-11 PROCEDURE — 80061 LIPID PANEL: CPT

## 2024-04-11 PROCEDURE — 36415 COLL VENOUS BLD VENIPUNCTURE: CPT

## 2024-04-11 PROCEDURE — 71275 CT ANGIOGRAPHY CHEST: CPT

## 2024-04-11 PROCEDURE — 99285 EMERGENCY DEPT VISIT HI MDM: CPT

## 2024-04-11 PROCEDURE — 93306 TTE W/DOPPLER COMPLETE: CPT | Mod: 26 | Performed by: INTERNAL MEDICINE

## 2024-04-11 PROCEDURE — 83036 HEMOGLOBIN GLYCOSYLATED A1C: CPT

## 2024-04-11 PROCEDURE — 80053 COMPREHEN METABOLIC PANEL: CPT

## 2024-04-11 PROCEDURE — 83690 ASSAY OF LIPASE: CPT

## 2024-04-11 PROCEDURE — 93306 TTE W/DOPPLER COMPLETE: CPT

## 2024-04-11 PROCEDURE — 84484 ASSAY OF TROPONIN QUANT: CPT

## 2024-04-11 PROCEDURE — 700117 HCHG RX CONTRAST REV CODE 255: Performed by: EMERGENCY MEDICINE

## 2024-04-11 PROCEDURE — 93005 ELECTROCARDIOGRAM TRACING: CPT | Performed by: EMERGENCY MEDICINE

## 2024-04-11 PROCEDURE — 700111 HCHG RX REV CODE 636 W/ 250 OVERRIDE (IP): Performed by: INTERNAL MEDICINE

## 2024-04-11 PROCEDURE — 85379 FIBRIN DEGRADATION QUANT: CPT

## 2024-04-11 PROCEDURE — 85025 COMPLETE CBC W/AUTO DIFF WBC: CPT

## 2024-04-11 RX ORDER — OMEPRAZOLE 20 MG/1
20 CAPSULE, DELAYED RELEASE ORAL 2 TIMES DAILY
Status: DISCONTINUED | OUTPATIENT
Start: 2024-04-11 | End: 2024-04-11

## 2024-04-11 RX ORDER — PROPRANOLOL HYDROCHLORIDE 10 MG/1
20 TABLET ORAL 2 TIMES DAILY
Status: DISCONTINUED | OUTPATIENT
Start: 2024-04-11 | End: 2024-04-11

## 2024-04-11 RX ORDER — ONDANSETRON 2 MG/ML
4 INJECTION INTRAMUSCULAR; INTRAVENOUS ONCE
Status: COMPLETED | OUTPATIENT
Start: 2024-04-11 | End: 2024-04-11

## 2024-04-11 RX ORDER — ONDANSETRON 2 MG/ML
4 INJECTION INTRAMUSCULAR; INTRAVENOUS EVERY 4 HOURS PRN
Status: DISCONTINUED | OUTPATIENT
Start: 2024-04-11 | End: 2024-04-11 | Stop reason: HOSPADM

## 2024-04-11 RX ORDER — OXYCODONE HYDROCHLORIDE 5 MG/1
5 TABLET ORAL
Status: DISCONTINUED | OUTPATIENT
Start: 2024-04-11 | End: 2024-04-11 | Stop reason: HOSPADM

## 2024-04-11 RX ORDER — LABETALOL HYDROCHLORIDE 5 MG/ML
10 INJECTION, SOLUTION INTRAVENOUS EVERY 4 HOURS PRN
Status: DISCONTINUED | OUTPATIENT
Start: 2024-04-11 | End: 2024-04-11 | Stop reason: HOSPADM

## 2024-04-11 RX ORDER — PROMETHAZINE HYDROCHLORIDE 25 MG/1
12.5-25 TABLET ORAL EVERY 4 HOURS PRN
Status: DISCONTINUED | OUTPATIENT
Start: 2024-04-11 | End: 2024-04-11 | Stop reason: HOSPADM

## 2024-04-11 RX ORDER — SILDENAFIL 50 MG/1
50 TABLET, FILM COATED ORAL
COMMUNITY

## 2024-04-11 RX ORDER — ASPIRIN 81 MG/1
81 TABLET ORAL DAILY
Status: DISCONTINUED | OUTPATIENT
Start: 2024-04-11 | End: 2024-04-11 | Stop reason: HOSPADM

## 2024-04-11 RX ORDER — REGADENOSON 0.08 MG/ML
0.4 INJECTION, SOLUTION INTRAVENOUS
Status: DISCONTINUED | OUTPATIENT
Start: 2024-04-11 | End: 2024-04-11 | Stop reason: HOSPADM

## 2024-04-11 RX ORDER — CAFFEINE CITRATE 20 MG/ML
60 SOLUTION INTRAVENOUS
Status: DISCONTINUED | OUTPATIENT
Start: 2024-04-11 | End: 2024-04-11 | Stop reason: HOSPADM

## 2024-04-11 RX ORDER — POLYETHYLENE GLYCOL 3350 17 G/17G
1 POWDER, FOR SOLUTION ORAL
Status: DISCONTINUED | OUTPATIENT
Start: 2024-04-11 | End: 2024-04-11 | Stop reason: HOSPADM

## 2024-04-11 RX ORDER — LEVOTHYROXINE SODIUM 0.07 MG/1
75 TABLET ORAL
Status: DISCONTINUED | OUTPATIENT
Start: 2024-04-11 | End: 2024-04-11

## 2024-04-11 RX ORDER — PROCHLORPERAZINE EDISYLATE 5 MG/ML
5-10 INJECTION INTRAMUSCULAR; INTRAVENOUS EVERY 4 HOURS PRN
Status: DISCONTINUED | OUTPATIENT
Start: 2024-04-11 | End: 2024-04-11 | Stop reason: HOSPADM

## 2024-04-11 RX ORDER — NITROGLYCERIN 0.4 MG/1
0.4 TABLET SUBLINGUAL
Status: DISCONTINUED | OUTPATIENT
Start: 2024-04-11 | End: 2024-04-11

## 2024-04-11 RX ORDER — LOSARTAN POTASSIUM 50 MG/1
50 TABLET ORAL
Status: DISCONTINUED | OUTPATIENT
Start: 2024-04-11 | End: 2024-04-11

## 2024-04-11 RX ORDER — ALUMINA, MAGNESIA, AND SIMETHICONE 2400; 2400; 240 MG/30ML; MG/30ML; MG/30ML
30 SUSPENSION ORAL EVERY 4 HOURS PRN
Status: DISCONTINUED | OUTPATIENT
Start: 2024-04-11 | End: 2024-04-11 | Stop reason: HOSPADM

## 2024-04-11 RX ORDER — ONDANSETRON 4 MG/1
4 TABLET, ORALLY DISINTEGRATING ORAL EVERY 4 HOURS PRN
Status: DISCONTINUED | OUTPATIENT
Start: 2024-04-11 | End: 2024-04-11 | Stop reason: HOSPADM

## 2024-04-11 RX ORDER — HYDROMORPHONE HYDROCHLORIDE 1 MG/ML
1 INJECTION, SOLUTION INTRAMUSCULAR; INTRAVENOUS; SUBCUTANEOUS ONCE
Status: COMPLETED | OUTPATIENT
Start: 2024-04-11 | End: 2024-04-11

## 2024-04-11 RX ORDER — PROMETHAZINE HYDROCHLORIDE 25 MG/1
12.5-25 SUPPOSITORY RECTAL EVERY 4 HOURS PRN
Status: DISCONTINUED | OUTPATIENT
Start: 2024-04-11 | End: 2024-04-11 | Stop reason: HOSPADM

## 2024-04-11 RX ORDER — AMOXICILLIN 250 MG
2 CAPSULE ORAL 2 TIMES DAILY
Status: DISCONTINUED | OUTPATIENT
Start: 2024-04-11 | End: 2024-04-11 | Stop reason: HOSPADM

## 2024-04-11 RX ORDER — HYDROMORPHONE HYDROCHLORIDE 1 MG/ML
0.25 INJECTION, SOLUTION INTRAMUSCULAR; INTRAVENOUS; SUBCUTANEOUS
Status: DISCONTINUED | OUTPATIENT
Start: 2024-04-11 | End: 2024-04-11 | Stop reason: HOSPADM

## 2024-04-11 RX ORDER — ERGOCALCIFEROL 1.25 MG/1
50000 CAPSULE ORAL
Status: DISCONTINUED | OUTPATIENT
Start: 2024-04-14 | End: 2024-04-11 | Stop reason: HOSPADM

## 2024-04-11 RX ORDER — ASPIRIN 325 MG
650 TABLET ORAL ONCE
COMMUNITY

## 2024-04-11 RX ORDER — ACETAMINOPHEN 325 MG/1
650 TABLET ORAL EVERY 6 HOURS PRN
Status: DISCONTINUED | OUTPATIENT
Start: 2024-04-11 | End: 2024-04-11 | Stop reason: HOSPADM

## 2024-04-11 RX ORDER — OXYCODONE HYDROCHLORIDE 5 MG/1
2.5 TABLET ORAL
Status: DISCONTINUED | OUTPATIENT
Start: 2024-04-11 | End: 2024-04-11 | Stop reason: HOSPADM

## 2024-04-11 RX ORDER — ENOXAPARIN SODIUM 100 MG/ML
40 INJECTION SUBCUTANEOUS DAILY
Status: DISCONTINUED | OUTPATIENT
Start: 2024-04-11 | End: 2024-04-11 | Stop reason: HOSPADM

## 2024-04-11 RX ORDER — MORPHINE SULFATE 4 MG/ML
1-2 INJECTION INTRAVENOUS
Status: DISCONTINUED | OUTPATIENT
Start: 2024-04-11 | End: 2024-04-11 | Stop reason: HOSPADM

## 2024-04-11 RX ADMIN — ONDANSETRON 4 MG: 2 INJECTION INTRAMUSCULAR; INTRAVENOUS at 07:38

## 2024-04-11 RX ADMIN — FAMOTIDINE 20 MG: 10 INJECTION, SOLUTION INTRAVENOUS at 10:13

## 2024-04-11 RX ADMIN — PROCHLORPERAZINE EDISYLATE 10 MG: 5 INJECTION INTRAMUSCULAR; INTRAVENOUS at 08:44

## 2024-04-11 RX ADMIN — HYDROMORPHONE HYDROCHLORIDE 1 MG: 1 INJECTION, SOLUTION INTRAMUSCULAR; INTRAVENOUS; SUBCUTANEOUS at 07:38

## 2024-04-11 RX ADMIN — IOHEXOL 100 ML: 350 INJECTION, SOLUTION INTRAVENOUS at 08:15

## 2024-04-11 ASSESSMENT — FIBROSIS 4 INDEX: FIB4 SCORE: 1.77

## 2024-04-11 ASSESSMENT — COPD QUESTIONNAIRES
DO YOU EVER COUGH UP ANY MUCUS OR PHLEGM?: YES, A FEW DAYS A WEEK OR MONTH
DURING THE PAST 4 WEEKS HOW MUCH DID YOU FEEL SHORT OF BREATH: SOME OF THE TIME
COPD SCREENING SCORE: 4
HAVE YOU SMOKED AT LEAST 100 CIGARETTES IN YOUR ENTIRE LIFE: NO/DON'T KNOW

## 2024-04-11 ASSESSMENT — PAIN DESCRIPTION - PAIN TYPE
TYPE: ACUTE PAIN

## 2024-04-11 ASSESSMENT — PATIENT HEALTH QUESTIONNAIRE - PHQ9
SUM OF ALL RESPONSES TO PHQ9 QUESTIONS 1 AND 2: 0
1. LITTLE INTEREST OR PLEASURE IN DOING THINGS: NOT AT ALL
2. FEELING DOWN, DEPRESSED, IRRITABLE, OR HOPELESS: NOT AT ALL

## 2024-04-11 ASSESSMENT — LIFESTYLE VARIABLES
AVERAGE NUMBER OF DAYS PER WEEK YOU HAVE A DRINK CONTAINING ALCOHOL: 0
ALCOHOL_USE: NO
TOTAL SCORE: 0
HAVE PEOPLE ANNOYED YOU BY CRITICIZING YOUR DRINKING: NO
EVER HAD A DRINK FIRST THING IN THE MORNING TO STEADY YOUR NERVES TO GET RID OF A HANGOVER: NO
EVER FELT BAD OR GUILTY ABOUT YOUR DRINKING: NO
ON A TYPICAL DAY WHEN YOU DRINK ALCOHOL HOW MANY DRINKS DO YOU HAVE: 0
TOTAL SCORE: 0
HOW MANY TIMES IN THE PAST YEAR HAVE YOU HAD 5 OR MORE DRINKS IN A DAY: 0
HAVE YOU EVER FELT YOU SHOULD CUT DOWN ON YOUR DRINKING: NO
TOTAL SCORE: 0
CONSUMPTION TOTAL: NEGATIVE

## 2024-04-11 NOTE — H&P
Hospital Medicine History & Physical Note    Date of Service  4/11/2024    Primary Care Physician  Francy Boland P.A.-C.    Consultants  None    Code Status  Full Code    Chief Complaint  Chief Complaint   Patient presents with    Chest Pressure     Around 3am this morning woke up and felt like there was an elephant on chest accompanied by SOB, increasing with exhertion    Patient took 300mg of aspirin and 50mg of Viagra at 0330 (with intention of decreasing bp), with no relief       Nausea       History of Presenting Illness  Woody Valdovinos is a 63 y.o. male with hypertension, hyperlipidemia, obesity, strong family cardiac history, who presented 4/11/2024 with sharp and pressure-like central chest pain radiating to the back in the epigastrium which awakened him from sleep at around 3 AM.  He had associated shortness of breath, and multiple episodes of vomiting, without any diaphoresis.  He also had an episode of very loose diarrhea.  He shared that he had minimal chest pain radiating to the neck earlier in the day.  He denied any other complaint such as fevers, chills or dysuria.  Symptoms persisted since prompting him to go to the ED.    ED course:  On evaluation, vital signs were stable.  Initial blood workup showed no leukocytosis, with normal electrolytes and renal function, normal LFTs and negative troponin.  EKG (personally reviewed) did not show any dynamic ischemic changes.  Chest x-ray (personally reviewed) did not show any infiltrates or consolidation.  CTA of the thoracoabdominal aorta did not show any aneurysm or dilated resection but did show CAD, along with diverticulosis without diverticulitis. Patient was given Dilaudid and Zofran in the ED, and was subsequently admitted to the hospitalist service.    I personally reviewed all lab results mentioned above. Prior medical records from this institution and outside facilities were independently reviewed as noted. I also personally reviewed all ER  physician and consultant recommendations and plans as documented above. History was independently obtained by myself. I discussed the plan of care with patient, family, bedside RN, charge RN, , pharmacy, and ERP .    Review of Systems  ROS    Pertinent positives/negatives as mentioned above.     A complete review of systems was personally done by me. All other systems were negative.       Past Medical History   has a past medical history of Hyperlipidemia, mild (7/24/2012), Hypertension (7/24/2012), Hypothyroidism (7/24/2012), Nonspecific abnormal electrocardiogram (ECG) (EKG) (7/24/2012), Obese (7/24/2012), and Snoring (7/24/2012).    Surgical History   has a past surgical history that includes foot surgery.     Family History  family history includes Heart Attack in his father.     Social History   reports that he has never smoked. He has never used smokeless tobacco. He reports current alcohol use. He reports that he does not use drugs.    Allergies  No Known Allergies    Medications  Prior to Admission Medications   Prescriptions Last Dose Informant Patient Reported? Taking?   CITRULLINE PO 4/9/2024 at [facility] Patient Yes Yes   Sig: Take 1 Dose by mouth two times a week.   aspirin (ASA) 325 MG Tab 4/11/2024 at 0330 Patient Yes Yes   Sig: Take 650 mg by mouth one time.   sildenafil citrate (VIAGRA) 50 MG tablet 4/11/2024 at 0330 Patient Yes Yes   Sig: Take 50 mg by mouth 1 time a day as needed for Erectile Dysfunction.   vitamin D2, Ergocalciferol, (DRISDOL) 1.25 MG (13113 UT) Cap capsule ABOUT 1 WEEK AGO at [facility] Patient No No   Sig: TAKE 1 CAPSULE BY MOUTH ONE TIME PER WEEK      Facility-Administered Medications: None       Physical Exam  Temp:  [36.4 °C (97.6 °F)] 36.4 °C (97.6 °F)  Pulse:  [62-91] 66  Resp:  [15-22] 22  BP: (141-182)/() 141/97  SpO2:  [94 %-98 %] 94 %  Blood Pressure: (!) 141/97   Temperature: 36.4 °C (97.6 °F)   Pulse: 66   Respiration: (!) 22   Pulse Oximetry: 94 %        Physical Exam  Vitals reviewed.   Constitutional:       General: He is not in acute distress.     Appearance: Normal appearance. He is obese. He is not toxic-appearing or diaphoretic.      Comments: Body mass index is 31.15 kg/m².   HENT:      Head: Normocephalic and atraumatic.      Right Ear: External ear normal.      Left Ear: External ear normal.      Mouth/Throat:      Mouth: Mucous membranes are moist.      Pharynx: No oropharyngeal exudate.   Eyes:      General: No scleral icterus.     Extraocular Movements: Extraocular movements intact.      Conjunctiva/sclera: Conjunctivae normal.      Pupils: Pupils are equal, round, and reactive to light.   Cardiovascular:      Rate and Rhythm: Normal rate and regular rhythm.      Heart sounds: Normal heart sounds. No murmur heard.     No gallop.   Pulmonary:      Effort: Pulmonary effort is normal. No respiratory distress.      Breath sounds: Normal breath sounds. No stridor. No wheezing, rhonchi or rales.   Chest:      Chest wall: No tenderness.   Abdominal:      General: Bowel sounds are normal. There is no distension.      Palpations: Abdomen is soft. There is no mass.      Tenderness: There is abdominal tenderness (minimal on the RUQ). There is no guarding or rebound.   Musculoskeletal:         General: No swelling. Normal range of motion.      Cervical back: Normal range of motion and neck supple.      Right lower leg: No edema.      Left lower leg: No edema.   Lymphadenopathy:      Cervical: No cervical adenopathy.   Skin:     General: Skin is warm and dry.      Coloration: Skin is not jaundiced.      Findings: No rash.   Neurological:      General: No focal deficit present.      Mental Status: He is alert and oriented to person, place, and time.      Cranial Nerves: No cranial nerve deficit.   Psychiatric:         Mood and Affect: Mood normal.         Behavior: Behavior normal.         Thought Content: Thought content normal.         Judgment: Judgment  "normal.         Laboratory:  Recent Labs     04/11/24  0640   WBC 6.6   RBC 5.89   HEMOGLOBIN 18.1*   HEMATOCRIT 51.4   MCV 87.3   MCH 30.7   MCHC 35.2   RDW 43.5   PLATELETCT 182   MPV 10.5     Recent Labs     04/11/24  0640   SODIUM 136   POTASSIUM 4.1   CHLORIDE 101   CO2 23   GLUCOSE 150*   BUN 8   CREATININE 0.83   CALCIUM 8.8     Recent Labs     04/11/24  0640   ALTSGPT 28   ASTSGOT 27   ALKPHOSPHAT 59   TBILIRUBIN 0.5   LIPASE 31   GLUCOSE 150*         No results for input(s): \"NTPROBNP\" in the last 72 hours.      Recent Labs     04/11/24  0640   TROPONINT <6       Imaging:  CT-CTA COMPLETE THORACOABDOMINAL AORTA   Final Result      1.  No aortic aneurysm or dissection identified.   2.  Coronary artery disease.   3.  Colonic diverticulosis without evidence of diverticulitis.                        DX-CHEST-PORTABLE (1 VIEW)   Final Result      No acute process.      EC-ECHOCARDIOGRAM COMPLETE W/O CONT    (Results Pending)   NM-CARDIAC STRESS TEST    (Results Pending)         Imaging studies and EKG results were independently reviewed as above.    Assessment/Plan:  Justification for Admission Status  I anticipate this patient is appropriate for observation status at this time because chest pain requiring further cardiac workup.    Patient will need a Telemetry bed on MEDICAL service .  The need is secondary to chest pain.    * Chest pain- (present on admission)  Assessment & Plan  The 10-year ASCVD risk score (Jeovany WATKINS, et al., 2019) is: 15.8%    Values used to calculate the score:      Age: 63 years      Sex: Male      Is Non- : No      Diabetic: No      Tobacco smoker: No      Systolic Blood Pressure: 141 mmHg      Is BP treated: Yes      HDL Cholesterol: 41 mg/dL      Total Cholesterol: 184 mg/dL    -With significant GI complaints with nausea or vomiting.  Had some tenderness on the right upper quadrant.  May be GI related, particularly gallbladder, however reasonable to pursue " further noninvasive cardiac workup given strong family cardiac history and personal cardiac risk factors.  -Start empiric aspirin for now until cardiac cause ruled out.  Check lipid profile and hemoglobin A1c for further risk stratification. We will do further cardiac monitoring to rule out arrhythmias.  -I will obtain serial troponins, and an echocardiogram.  If troponins remain negative, would proceed with nuclear cardiac stress test.  -Start as needed sublingual nitroglycerin, and morphine for pain recurrence.   -For completion, I will obtain a d-dimer, and if elevated will proceed with ruling out PE.  Check urine drug screen.  I will also obtain a right upper quadrant ultrasound to evaluate the gallbladder.  -For possibility of GI related chest pain, I will start him on IV famotidine, along with PRN GI cocktail, and Maalox.      Obesity (BMI 30.0-34.9)- (present on admission)  Assessment & Plan  - Body mass index is 31.15 kg/m²..  -  on weight loss.  - outpatient referral for outpatient weight management.    Hyperlipidemia, mild- (present on admission)  Assessment & Plan  - Not on statin.  Check lipid profile.    Hypertension- (present on admission)  Assessment & Plan  - Reportedly not taking antihypertensives. Monitor blood pressure trend closely, start as needed IV labetalol for significant symptomatic hypertension.  Optimize blood pressure control.        VTE prophylaxis: enoxaparin ppx

## 2024-04-11 NOTE — ED NOTES
Med Rec completed per patient   Allergies reviewed  No ORAL antibiotics in last 30 days    Patient is not taking anticoagulants     Patient states that he took Asprin 325 mg x2 (650 mg) today around 0330

## 2024-04-11 NOTE — PROGRESS NOTES
Pt arrived to unit via gurney. Pt oriented to room, unit, and plan of care. Tele-monitor placed and verified with monitor room. All questions answered at this time. Call light within reach, fall precautions in place, will continue to monitor.

## 2024-04-11 NOTE — CARE PLAN
The patient is Stable - Low risk of patient condition declining or worsening    Shift Goals  Clinical Goals: Troponin WDL, pt remain free of chest pain  Patient Goals: Eat    Progress made toward(s) clinical / shift goals: Pt has remained chest pain free while on admission, troponins stable        Patient is not progressing towards the following goals:

## 2024-04-11 NOTE — ED NOTES
ERP re-eval completed at bedside. Medicated per MAR for persistent N/V. Repeat troponin collected and sent to lab. Admitting MD at bedside.

## 2024-04-11 NOTE — ED NOTES
Assumed patient care, bedside report received from MARY Vidales. EKG completed and reviewed by ERP at bedside. SR on monitor, continuous cardiac, pulse ox, and BP monitoring in place.     Fall precautions in place including but not limited to: call light within reach, bed locked and in the lowest position, floors are clean and dry, patient's personal possessions are within their safe reach, nonskid socks/shoes on patient, appropriate sign in place.      POC discussed with patient, all needs addressed at this time.

## 2024-04-11 NOTE — ED PROVIDER NOTES
ED Provider Note    CHIEF COMPLAINT  Chief Complaint   Patient presents with    Chest Pressure     Around 3am this morning woke up and felt like there was an elephant on chest accompanied by SOB, increasing with exhertion    Patient took 300mg of aspirin and 50mg of Viagra at 0330 (with intention of decreasing bp), with no relief       Nausea       EXTERNAL RECORDS REVIEWED  Prior EKG done in clinic 2023 which failed to reveal any evidence of acute regional ischemia    HPI/ROS      Woody Valdovinos is a 63 y.o. male who presents with cc of chest pain. Pt here with chest pressure that started at around 3AM and has persisted since that time.  Patient states that he actually had some very minimal chest pain that was radiating to his neck earlier yesterday, this was not so bothersome so he went back to his day without issue.  He woke up with significant chest pain, described as having a tight vice around his chest.  He reports associated shortness of breath.  Chest pain was associated with some associated nausea and he vomited once.  Emesis is nonbloody nonbilious.  Patient denies any changes in his bowel movements.  Patient denies associate diaphoresis.  Patient denies any history of smoking, he reports he is otherwise healthy, his dad  of a heart attack at around 60 years old.  Patient reports that the chest pain is mostly localized in his chest, is pressure-like but every once a while he will get a sharp stabbing pain in his epigastrium that radiates to his back and is severe.  This is occurred once every 30 minutes and last for around a minute since the pain started.  Patient reports he is drink 1 alcoholic beverage yesterday which is normal for him.  He denies any associated fevers or chills.    PAST MEDICAL HISTORY   has a past medical history of Hyperlipidemia, mild (2012), Hypertension (2012), Hypothyroidism (2012), Nonspecific abnormal electrocardiogram (ECG) (EKG) (2012), Obese  "(7/24/2012), and Snoring (7/24/2012).    SURGICAL HISTORY   has a past surgical history that includes foot surgery.    FAMILY HISTORY  Family History   Problem Relation Age of Onset    Heart Attack Father        SOCIAL HISTORY  Social History     Tobacco Use    Smoking status: Never    Smokeless tobacco: Never   Vaping Use    Vaping Use: Never used   Substance and Sexual Activity    Alcohol use: Yes     Comment: \"daily, 1 beer\"    Drug use: No    Sexual activity: Not on file     Comment:        CURRENT MEDICATIONS  Home Medications       Reviewed by Kaelyn Crawley R.N. (Registered Nurse) on 04/11/24 at 0623  Med List Status: Partial     Medication Last Dose Status   levothyroxine (SYNTHROID) 75 MCG Tab  Active   losartan (COZAAR) 50 MG Tab  Active   propranolol (INDERAL) 20 MG Tab  Active   vitamin D2, Ergocalciferol, (DRISDOL) 1.25 MG (15114 UT) Cap capsule  Active                    ALLERGIES  No Known Allergies    PHYSICAL EXAM  VITAL SIGNS: BP (!) 168/98   Pulse 62   Temp 36.4 °C (97.6 °F) (Temporal)   Resp 15   Ht 1.803 m (5' 11\")   Wt 101 kg (223 lb 5.2 oz)   SpO2 96%   BMI 31.15 kg/m²    Physical Exam  Constitutional:       Appearance: Normal appearance.   HENT:      Head: Normocephalic.      Right Ear: Tympanic membrane normal.      Left Ear: Tympanic membrane normal.      Nose: Nose normal.      Mouth/Throat:      Mouth: Mucous membranes are moist.   Eyes:      Extraocular Movements: Extraocular movements intact.      Pupils: Pupils are equal, round, and reactive to light.   Cardiovascular:      Rate and Rhythm: Normal rate and regular rhythm.   Pulmonary:      Effort: Pulmonary effort is normal. No respiratory distress.      Breath sounds: Normal breath sounds. No stridor. No wheezing or rales.   Chest:      Chest wall: No tenderness.   Abdominal:      General: Abdomen is flat. There is no distension.      Palpations: Abdomen is soft. There is no mass.      Tenderness: There is " abdominal tenderness.      Comments: Minimal tenderness to palpation of epigastrium.  No tenderness of right upper quadrant.   Musculoskeletal:      Cervical back: Normal range of motion.      Right lower leg: No edema.      Left lower leg: No edema.      Comments: Distal pulses are all 2+ and equal.   Skin:     General: Skin is warm.      Capillary Refill: Capillary refill takes less than 2 seconds.   Neurological:      General: No focal deficit present.      Mental Status: He is alert and oriented to person, place, and time.   Psychiatric:         Mood and Affect: Mood normal.           EKG/LABS  Results for orders placed or performed during the hospital encounter of 04/11/24   CBC with Differential   Result Value Ref Range    WBC 6.6 4.8 - 10.8 K/uL    RBC 5.89 4.70 - 6.10 M/uL    Hemoglobin 18.1 (H) 14.0 - 18.0 g/dL    Hematocrit 51.4 42.0 - 52.0 %    MCV 87.3 81.4 - 97.8 fL    MCH 30.7 27.0 - 33.0 pg    MCHC 35.2 32.3 - 36.5 g/dL    RDW 43.5 35.9 - 50.0 fL    Platelet Count 182 164 - 446 K/uL    MPV 10.5 9.0 - 12.9 fL    Neutrophils-Polys 69.90 44.00 - 72.00 %    Lymphocytes 18.30 (L) 22.00 - 41.00 %    Monocytes 8.50 0.00 - 13.40 %    Eosinophils 1.50 0.00 - 6.90 %    Basophils 0.90 0.00 - 1.80 %    Immature Granulocytes 0.90 0.00 - 0.90 %    Nucleated RBC 0.00 0.00 - 0.20 /100 WBC    Neutrophils (Absolute) 4.57 1.82 - 7.42 K/uL    Lymphs (Absolute) 1.20 1.00 - 4.80 K/uL    Monos (Absolute) 0.56 0.00 - 0.85 K/uL    Eos (Absolute) 0.10 0.00 - 0.51 K/uL    Baso (Absolute) 0.06 0.00 - 0.12 K/uL    Immature Granulocytes (abs) 0.06 0.00 - 0.11 K/uL    NRBC (Absolute) 0.00 K/uL   Complete Metabolic Panel (CMP)   Result Value Ref Range    Sodium 136 135 - 145 mmol/L    Potassium 4.1 3.6 - 5.5 mmol/L    Chloride 101 96 - 112 mmol/L    Co2 23 20 - 33 mmol/L    Anion Gap 12.0 7.0 - 16.0    Glucose 150 (H) 65 - 99 mg/dL    Bun 8 8 - 22 mg/dL    Creatinine 0.83 0.50 - 1.40 mg/dL    Calcium 8.8 8.5 - 10.5 mg/dL    Correct  Calcium 8.5 8.5 - 10.5 mg/dL    AST(SGOT) 27 12 - 45 U/L    ALT(SGPT) 28 2 - 50 U/L    Alkaline Phosphatase 59 30 - 99 U/L    Total Bilirubin 0.5 0.1 - 1.5 mg/dL    Albumin 4.4 3.2 - 4.9 g/dL    Total Protein 7.3 6.0 - 8.2 g/dL    Globulin 2.9 1.9 - 3.5 g/dL    A-G Ratio 1.5 g/dL   Troponins NOW   Result Value Ref Range    Troponin T <6 6 - 19 ng/L   Troponins in two (2) hours   Result Value Ref Range    Troponin T <6 6 - 19 ng/L   ESTIMATED GFR   Result Value Ref Range    GFR (CKD-EPI) 98 >60 mL/min/1.73 m 2   LIPASE   Result Value Ref Range    Lipase 31 11 - 82 U/L   D-Dimer   Result Value Ref Range    D-Dimer <0.27 0.00 - 0.50 ug/mL (FEU)   Lipid Profile   Result Value Ref Range    Cholesterol,Tot 165 100 - 199 mg/dL    Triglycerides 58 0 - 149 mg/dL    HDL 48 >=40 mg/dL     (H) <100 mg/dL   MAGNESIUM   Result Value Ref Range    Magnesium 2.1 1.5 - 2.5 mg/dL   HEMOGLOBIN A1C   Result Value Ref Range    Glycohemoglobin 6.2 (H) 4.0 - 5.6 %    Est Avg Glucose 131 mg/dL   EKG   Result Value Ref Range    Report       [facility] Emergency Dept.    Test Date:  2024  Pt Name:    YOEL ANDREWS                Department: ER  MRN:        [id number]                      Room:  Gender:     Male                         Technician: [id number]  :        1960                   Requested By:ER TRIAGE PROTOCOL  Order #:    [id number]                    Reading MD: Kerry Shah MD    Measurements  Intervals                                Axis  Rate:       69                           P:          61  IN:         162                          QRS:        22  QRSD:       82                           T:          81  QT:         406  QTc:        435    Interpretive Statements  EKG was normal sinus rhythm, normal axis normal intervals no ST changes  consistent with acute regional ischemia  Electronically Signed On 2024 06:51:38 PDT by Kerry Shah MD     EKG   Result Value Ref Range    Report        [facility] Emergency Dept.    Test Date:  2024  Pt Name:    YOEL ANDREWS                Department: ER  MRN:        [id number]                      Room:  Gender:     Male                         Technician: [id number]  :        1960                   Requested By:ER TRIAGE PROTOCOL  Order #:    [id number]                    Reading MD: Kerry Shah MD    Measurements  Intervals                                Axis  Rate:       64                           P:          0  [place]:         0                            QRS:        3  QRSD:       95                           T:          70  QT:         409  QTc:        422    Interpretive Statements  EKG is normal sinus rhythm, normal axis normal intervals no overt ST  elevation consistent with acute regional ischemia  Electronically Signed On 2024 08:26:59 PDT by Kerry Shah MD         I have independently interpreted this EKG    RADIOLOGY/PROCEDURES   I have independently interpreted the diagnostic imaging associated with this visit and am waiting the final reading from the radiologist.   My preliminary interpretation is as follows: CTA without any evidence of dissection or large pericardial effusion    Radiologist interpretation:  CT-CTA COMPLETE THORACOABDOMINAL AORTA   Final Result      1.  No aortic aneurysm or dissection identified.   2.  Coronary artery disease.   3.  Colonic diverticulosis without evidence of diverticulitis.                        DX-CHEST-PORTABLE (1 VIEW)   Final Result      No acute process.      EC-ECHOCARDIOGRAM COMPLETE W/O CONT    (Results Pending)   NM-CARDIAC STRESS TEST    (Results Pending)   US-RUQ    (Results Pending)       COURSE & MEDICAL DECISION MAKING    ASSESSMENT, COURSE AND PLAN  Care Narrative: Well-appearing patient here with highly concerning symptoms for possible ACS.  EKG is reassuring.  Will check troponin, will need to trend given patient's relatively rapid presentation here given  patient's associated paroxysms of significant sharp stabbing back pain will check CTA to evaluate patient's aorta though my suspicion of dissection remains relatively low.  Patient certainly could have pancreatitis and will check lipase.  He has no right upper quadrant tenderness to suggest cholecystitis.  Symptoms are not in his right side of his chest, I believe referred cholelithiasis pain will be significantly less likely.  Patient labs are very reassuring.'s CTA is unremarkable.  Given patient's symptomatology and age and family history I do believe that further cardiac workup here in the hospital is indicated.  His heart score is 4.  On bedside ultrasound patient does have evidence of gallstones.  Certainly an atypical presentation of symptomatic gallstones is possible as the cause but I would still consider this a diagnosis of exclusion and feel the patient requires further cardiac workup.  I discussed case with hospitalist who has agreed to admit.  Official ultrasound ordered.              DISPOSITION AND DISCUSSIONS  I have discussed management of the patient with the following physicians and YASSINE's: L[place]        Decision tools and prescription drugs considered including, but not limited to: Heart score of 4    FINAL DIAGNOSIS  1. Chest pain, unspecified type    Gall Stones

## 2024-04-11 NOTE — PROGRESS NOTES
Per nuclear medicine pt has to wait for stress test d/t taking Viagra this AM, has to wait 48 hours from time Viagra was taken. Pt updated, does not want to wait, asking to be discharged after ultrasound completed. MD updated, states will continue to trend troponins and get RUQ US and DC if all results are WDL. Pt updated and verbalized understanding.

## 2024-04-11 NOTE — ED NOTES
Pt amb to rr and back to rm with steady gait and SBA. UA collected and sent. Pt reports improvement in pain and nausea.

## 2024-04-11 NOTE — ASSESSMENT & PLAN NOTE
The 10-year ASCVD risk score (Jeovany WATKINS, et al., 2019) is: 15.8%    Values used to calculate the score:      Age: 63 years      Sex: Male      Is Non- : No      Diabetic: No      Tobacco smoker: No      Systolic Blood Pressure: 141 mmHg      Is BP treated: Yes      HDL Cholesterol: 41 mg/dL      Total Cholesterol: 184 mg/dL    -With significant GI complaints with nausea or vomiting.  Had some tenderness on the right upper quadrant.  May be GI related, particularly gallbladder, however reasonable to pursue further noninvasive cardiac workup given strong family cardiac history and personal cardiac risk factors.  -Start empiric aspirin for now until cardiac cause ruled out.  Check lipid profile and hemoglobin A1c for further risk stratification. We will do further cardiac monitoring to rule out arrhythmias.  -I will obtain serial troponins, and an echocardiogram.  If troponins remain negative, would proceed with nuclear cardiac stress test.  -Start as needed sublingual nitroglycerin, and morphine for pain recurrence.   -For completion, I will obtain a d-dimer, and if elevated will proceed with ruling out PE.  Check urine drug screen.  I will also obtain a right upper quadrant ultrasound to evaluate the gallbladder.  -For possibility of GI related chest pain, I will start him on IV famotidine, along with PRN GI cocktail, and Maalox.

## 2024-04-11 NOTE — ASSESSMENT & PLAN NOTE
- Body mass index is 31.15 kg/m²..  -  on weight loss.  - outpatient referral for outpatient weight management.

## 2024-04-11 NOTE — ASSESSMENT & PLAN NOTE
- Reportedly not taking antihypertensives. Monitor blood pressure trend closely, start as needed IV labetalol for significant symptomatic hypertension.  Optimize blood pressure control.

## 2024-04-11 NOTE — ED TRIAGE NOTES
Chief Complaint   Patient presents with    Chest Pressure     Around 3am this morning woke up and felt like there was an elephant on chest accompanied by SOB, increasing with exhertion    Patient took 300mg of aspirin and 50mg of Viagra at 0330 (with intention of decreasing bp), with no relief       Nausea     Pt ambulatory to triage for above complaint.      Pt is alert/oriented and follows commands. Pt speaking in full sentences and responds appropriately to questions. No acute distress noted in triage and respirations are even and unlabored.     Pt placed in lobby and educated on triage process. Pt encouraged to alert staff for any changes in condition.

## 2024-04-12 NOTE — PROGRESS NOTES
Report received from AM RN at 1900. Patient sitting up in chair, denies pain. Awaiting US results for D/C planning and MD notification.

## 2024-04-12 NOTE — PROGRESS NOTES
US RUQ resulted. Notified NOC APRN of results and POC. POC currently to discharge tonight once US RUQ resulted. Discussed with patient that discharge wouldn't be until at least 10PM for paperwork and orders to be completed. Patient does not want to wait until 10PM and would like to go home right now. Updated NOC APRN. Discussed with patient that this will be an AMA discharge. Educated patient on need to follow up with PCP and share results of US and blood work with PCP. Educated patient to return to ER if having any worsening of pain or onset of chest pain. Patient agreeable to POC and discharge. NOC APRN notified of discharge. PIV removed. Patient discharged with SO at bedside and ambulated off unit with SO.

## 2025-05-21 ENCOUNTER — OFFICE VISIT (OUTPATIENT)
Dept: MEDICAL GROUP | Facility: CLINIC | Age: 65
End: 2025-05-21
Payer: COMMERCIAL

## 2025-05-21 VITALS
RESPIRATION RATE: 20 BRPM | HEIGHT: 71 IN | WEIGHT: 216.49 LBS | SYSTOLIC BLOOD PRESSURE: 142 MMHG | TEMPERATURE: 98.6 F | HEART RATE: 90 BPM | BODY MASS INDEX: 30.31 KG/M2 | DIASTOLIC BLOOD PRESSURE: 80 MMHG | OXYGEN SATURATION: 97 %

## 2025-05-21 DIAGNOSIS — H53.8 SEES FLASHES OF LIGHT: ICD-10-CM

## 2025-05-21 DIAGNOSIS — R42 VERTIGO: Primary | ICD-10-CM

## 2025-05-21 DIAGNOSIS — Z12.5 PROSTATE CANCER SCREENING: ICD-10-CM

## 2025-05-21 DIAGNOSIS — E03.9 HYPOTHYROIDISM, UNSPECIFIED TYPE: ICD-10-CM

## 2025-05-21 DIAGNOSIS — R73.9 HYPERGLYCEMIA: ICD-10-CM

## 2025-05-21 DIAGNOSIS — E55.9 VITAMIN D DEFICIENCY: ICD-10-CM

## 2025-05-21 DIAGNOSIS — E78.5 HYPERLIPIDEMIA, MILD: ICD-10-CM

## 2025-05-21 DIAGNOSIS — I10 HYPERTENSION, UNSPECIFIED TYPE: ICD-10-CM

## 2025-05-21 DIAGNOSIS — I51.7 LEFT VENTRICULAR HYPERTROPHY: ICD-10-CM

## 2025-05-21 DIAGNOSIS — R25.1 TREMOR: ICD-10-CM

## 2025-05-21 DIAGNOSIS — M54.9 MID BACK PAIN: ICD-10-CM

## 2025-05-21 PROCEDURE — 3077F SYST BP >= 140 MM HG: CPT | Performed by: PHYSICIAN ASSISTANT

## 2025-05-21 PROCEDURE — 99214 OFFICE O/P EST MOD 30 MIN: CPT | Performed by: PHYSICIAN ASSISTANT

## 2025-05-21 PROCEDURE — 3079F DIAST BP 80-89 MM HG: CPT | Performed by: PHYSICIAN ASSISTANT

## 2025-05-21 RX ORDER — AMLODIPINE BESYLATE 5 MG/1
5 TABLET ORAL DAILY
Qty: 100 TABLET | Refills: 3 | Status: SHIPPED | OUTPATIENT
Start: 2025-05-21 | End: 2026-06-25

## 2025-05-21 ASSESSMENT — FIBROSIS 4 INDEX: FIB4 SCORE: 1.79

## 2025-05-21 ASSESSMENT — PATIENT HEALTH QUESTIONNAIRE - PHQ9: CLINICAL INTERPRETATION OF PHQ2 SCORE: 0

## 2025-05-21 NOTE — PROGRESS NOTES
cc:  blood pressure    Subjective:     Woody Valdovinos is a 64 y.o. male presenting for blood pressure        History of Present Illness  The patient is a 64-year-old male who presents to the office today with complaints of vertigo symptoms and blood pressure symptoms. He was last seen on 01/04/2023. His last routine lab work was in 11/2022, which showed an abnormal thyroid level. He was started on 50 mcg of thyroid medication in 11/2022, which was increased to 75 mcg on 01/04/2023. However, further testing was not done, and he was lost to follow-up.    He experienced an episode of vertigo characterized by light flashes and inability to stand after consuming 3 alcoholic beverages at a golf [place] in [place]. He did not seek immediate medical attention. The following day, he was able to stand without experiencing room spinning or visual disturbances but reported unsteadiness. He has been practicing balance exercises, such as leaning against a wall and lifting one leg. He reports no current dizziness but struggles to maintain balance while riding a motorcycle. He has not had his ears checked despite significant hearing loss. He has been experiencing vertigo for over a year, initially accompanied by bright flashes of light, but currently only experiences a sensation of tipping over. He reports no vision loss and has an upcoming appointment with an ophthalmologist. He has no metallic implants and is not claustrophobic. He has previously tried hydrochlorothiazide for a week without any noticeable improvement. He reports no change in his dizziness or unsteadiness with alcohol consumption but notes that his tremors worsen during social events.    Upon checking his blood pressure, he found it to be elevated and subsequently discontinued his antihypertensive medication, leading to a decrease in blood pressure the next day. However, he has been experiencing persistent dizziness since then, which has improved over time. He  resumed his antihypertensive medication after a 5-month hiatus, resulting in a blood pressure reading of 160/110. His blood pressure has been erratic, with readings ranging from 103/65 to 168/122. He did not seek medical attention for these episodes. He has been self-medicating with sildenafil 25 mg daily, which he reports has been effective in controlling his blood pressure, typically around 128/77. He attempted to resume his antihypertensive medication for 2 days but experienced fluctuations in his blood pressure, leading him to discontinue the medication. He has undergone cardiac testing for life insurance purposes, which revealed an elevated marker suggestive of increased risk for myocardial infarction. However, further testing by a cardiologist indicated normal cardiac function. He experienced an episode of diaphoresis and severe chest pain at night, initially suspected to be a myocardial infarction, but subsequent testing revealed cholelithiasis. He was advised to undergo cholecystectomy but opted for conservative management with folic acid supplementation for 3 months, after which his symptoms resolved.    He sustained a work-related arm injury on 03/24/2025, which has been gradually improving. He reports tenderness and pain in the joint when attempting to lift his arm. Initially, he was unable to drive due to the injury, but his arm mobility has since improved. However, he still experiences excruciating pain when forcing his arm up.    He has been experiencing lower back pain for several years and has an upcoming appointment with a spinal center. He reports difficulty in straightening up after bending over.    He has a history of migraine headaches, which have decreased in frequency with age. He has previously tried propranolol for his tremors but discontinued it due to lack of efficacy.       Review of systems:  See above.   Denies any symptoms unless previously indicated.      Current  "Medications[1]    Allergies, past medical history, past surgical history, family history, social history reviewed and updated    Objective:     Vitals: BP (!) 142/80   Pulse 90   Temp 37 °C (98.6 °F) (Temporal)   Resp 20   Ht 1.791 m (5' 10.5\")   Wt 98.2 kg (216 lb 7.9 oz)   SpO2 97%   BMI 30.62 kg/m²   General: Alert, pleasant, NAD  EYES:   PERRL, EOMI, no icterus or pallor.  Conjunctivae and lids normal.   HENT:  Normocephalic.  External ears normal. Tympanic membranes pearly, opaque.  No nasal drainage present.  No carotid thrills or bruits  Neck supple.   Heart: Regular rate and rhythm.  S1 and S2 normal.  No murmurs appreciated.  Respiratory: Normal respiratory effort.  Clear to auscultation bilaterally.  Abdomen: obese  Skin: Warm, dry, no rashes.  Musculoskeletal: Gait is normal.  Limite range of motion left shoulder  Neurological: No tremors, sensation grossly intact, CN2-12 intact.  Psych:  Affect/mood is normal, judgement is good, memory is intact, grooming is appropriate.    Physical Exam  Ears: External ear canals clean, no earwax buildup  Neck: No abnormalities noted  Respiratory: Clear to auscultation, no wheezing, rales or rhonchi  Cardiovascular: Regular rate and rhythm, no murmurs, rubs, or gallops       Results  Labs   - Thyroid level: 11/2022, Abnormal       Assessment/Plan:     Woody was seen today for dizziness.    Diagnoses and all orders for this visit:    Left ventricular hypertrophy    Hyperglycemia  -     Comp Metabolic Panel; Future  -     HEMOGLOBIN A1C; Future    Hypertension, unspecified type  -     amLODIPine (NORVASC) 5 MG Tab; Take 1 Tablet by mouth every day.    Vitamin D deficiency  -     VITAMIN D,25 HYDROXY (DEFICIENCY); Future    Hyperlipidemia, mild  -     Lipid Profile; Future  -     Comp Metabolic Panel; Future    Hypothyroidism, unspecified type  -     TSH WITH REFLEX TO FT4; Future    Prostate cancer screening  -     PROSTATE SPECIFIC AG SCREENING; " Future    Vertigo  -     MR-BRAIN-WITH & W/O; Future  -     Referral to ENT    Tremor  -     MR-BRAIN-WITH & W/O; Future    Sees flashes of light  -     MR-BRAIN-WITH & W/O; Future    Mid back pain  -     DX-THORACIC SPINE-2 VIEWS; Future        Assessment & Plan  1. Vertigo.  - Symptoms have persisted for at least a year, with episodes of seeing flashes of light.  - MRI with and without contrast will be obtained.  - Referral to ENT has been made.  - Labs will be obtained to evaluate further, including thyroid levels.    2. Hypertension.  - He has been using sildenafil for blood pressure control but ran out 2 days ago.  - Blood pressure has been erratic, ranging from 103/65 to 168/122.  - Amlodipine 5 mg once daily will be prescribed as an alternative.  - If he cannot tolerate it, he should stop it and let us know.    3. Tremor.  - He has not responded well to propranolol in the past.  - The tremor may be related to vertigo.  - An MRI will be ordered to evaluate further.    4. Mid back pain.  - Pain is localized near the thoracic vertebrae, exacerbated by certain movements.  - A mid thoracic x-ray will be ordered to evaluate further.    5. Left ventricular hypertrophy.  - Observed on echocardiogram.  - He has previously followed up with cardiology.    6. Prostate cancer screening.  - Lab work will be ordered to evaluate further.    7. Hyperglycemia.  - Labs will be ordered to evaluate further.    8. Vitamin D deficiency.  - He has been taking vitamin D supplements as prescribed.  - Labs will be ordered to evaluate further.    9. Hyperlipidemia.  - Labs will be ordered to evaluate further.    10. Hypothyroidism.  - He was previously on thyroid medication but has not been taking it recently.  - Labs will be ordered to check thyroid levels.    Follow-up  - The patient will follow up in approximately 4 to 6 weeks.    No follow-ups on file.    Please note that this dictation was created using voice recognition software.  I have made every reasonable attempt to correct obvious errors, but expect that there are errors of grammar and possible content that I did not discover before finalizing note.               [1]   Current Outpatient Medications:     amLODIPine (NORVASC) 5 MG Tab, Take 1 Tablet by mouth every day., Disp: 100 Tablet, Rfl: 3    sildenafil citrate (VIAGRA) 50 MG tablet, Take 50 mg by mouth 1 time a day as needed for Erectile Dysfunction. (Patient taking differently: Take 50 mg by mouth 1 time a day as needed for Erectile Dysfunction. Pt was taking 25mg daily getting rx from [place]), Disp: , Rfl:     vitamin D2, Ergocalciferol, (DRISDOL) 1.25 MG (51621 UT) Cap capsule, TAKE 1 CAPSULE BY MOUTH ONE TIME PER WEEK, Disp: 4 Capsule, Rfl: 0    aspirin (ASA) 325 MG Tab, Take 650 mg by mouth one time. (Patient not taking: Reported on 5/21/2025), Disp: , Rfl:     CITRULLINE PO, Take 1 Dose by mouth two times a week. (Patient not taking: Reported on 5/21/2025), Disp: , Rfl:

## 2025-05-27 ENCOUNTER — NON-PROVIDER VISIT (OUTPATIENT)
Dept: MEDICAL GROUP | Facility: CLINIC | Age: 65
End: 2025-05-27
Payer: COMMERCIAL

## 2025-05-27 ENCOUNTER — HOSPITAL ENCOUNTER (OUTPATIENT)
Facility: MEDICAL CENTER | Age: 65
End: 2025-05-27
Attending: PHYSICIAN ASSISTANT
Payer: COMMERCIAL

## 2025-05-27 DIAGNOSIS — Z12.5 PROSTATE CANCER SCREENING: ICD-10-CM

## 2025-05-27 DIAGNOSIS — R73.9 HYPERGLYCEMIA: ICD-10-CM

## 2025-05-27 DIAGNOSIS — E55.9 VITAMIN D DEFICIENCY: ICD-10-CM

## 2025-05-27 DIAGNOSIS — E78.5 HYPERLIPIDEMIA, MILD: ICD-10-CM

## 2025-05-27 DIAGNOSIS — E03.9 HYPOTHYROIDISM, UNSPECIFIED TYPE: ICD-10-CM

## 2025-05-27 LAB
25(OH)D3 SERPL-MCNC: 43 NG/ML (ref 30–100)
ALBUMIN SERPL BCP-MCNC: 4.2 G/DL (ref 3.2–4.9)
ALBUMIN/GLOB SERPL: 1.4 G/DL
ALP SERPL-CCNC: 64 U/L (ref 30–99)
ALT SERPL-CCNC: 37 U/L (ref 2–50)
ANION GAP SERPL CALC-SCNC: 11 MMOL/L (ref 7–16)
AST SERPL-CCNC: 31 U/L (ref 12–45)
BILIRUB SERPL-MCNC: 1.9 MG/DL (ref 0.1–1.5)
BUN SERPL-MCNC: 19 MG/DL (ref 8–22)
CALCIUM ALBUM COR SERPL-MCNC: 8.6 MG/DL (ref 8.5–10.5)
CALCIUM SERPL-MCNC: 8.8 MG/DL (ref 8.5–10.5)
CHLORIDE SERPL-SCNC: 107 MMOL/L (ref 96–112)
CHOLEST SERPL-MCNC: 192 MG/DL (ref 100–199)
CO2 SERPL-SCNC: 22 MMOL/L (ref 20–33)
CREAT SERPL-MCNC: 1.24 MG/DL (ref 0.5–1.4)
EST. AVERAGE GLUCOSE BLD GHB EST-MCNC: 128 MG/DL
GFR SERPLBLD CREATININE-BSD FMLA CKD-EPI: 65 ML/MIN/1.73 M 2
GLOBULIN SER CALC-MCNC: 3.1 G/DL (ref 1.9–3.5)
GLUCOSE SERPL-MCNC: 118 MG/DL (ref 65–99)
HBA1C MFR BLD: 6.1 % (ref 4–5.6)
HDLC SERPL-MCNC: 46 MG/DL
LDLC SERPL CALC-MCNC: 123 MG/DL
POTASSIUM SERPL-SCNC: 4.4 MMOL/L (ref 3.6–5.5)
PROT SERPL-MCNC: 7.3 G/DL (ref 6–8.2)
PSA SERPL DL<=0.01 NG/ML-MCNC: 0.49 NG/ML (ref 0–4)
SODIUM SERPL-SCNC: 140 MMOL/L (ref 135–145)
T4 FREE SERPL-MCNC: 0.75 NG/DL (ref 0.93–1.7)
TRIGL SERPL-MCNC: 117 MG/DL (ref 0–149)
TSH SERPL DL<=0.005 MIU/L-ACNC: 8.89 UIU/ML (ref 0.38–5.33)

## 2025-05-27 PROCEDURE — 83036 HEMOGLOBIN GLYCOSYLATED A1C: CPT

## 2025-05-27 PROCEDURE — 80061 LIPID PANEL: CPT

## 2025-05-27 PROCEDURE — 84439 ASSAY OF FREE THYROXINE: CPT

## 2025-05-27 PROCEDURE — 80053 COMPREHEN METABOLIC PANEL: CPT

## 2025-05-27 PROCEDURE — 84443 ASSAY THYROID STIM HORMONE: CPT

## 2025-05-27 PROCEDURE — 82306 VITAMIN D 25 HYDROXY: CPT

## 2025-05-27 PROCEDURE — 84153 ASSAY OF PSA TOTAL: CPT

## 2025-05-27 NOTE — PROGRESS NOTES
Woody Valdovinos is a 64 y.o. male here for a non-provider visit for a lab draw on 5/27/2025 at 8:10 AM.    Procedure performed:  Venipuncture     Anatomical site:  Right Antecubital Area    Equipment used:  21 g butterfly     Labs drawn:  A1c, Comp Metabolic Panel , Lipid Profile, Vitamin D , TSH, and PSA    Ordering provider:  Francy Boland PAC      Lab draw completed by:  Gerard Palma Ass't

## 2025-05-28 ENCOUNTER — RESULTS FOLLOW-UP (OUTPATIENT)
Dept: MEDICAL GROUP | Facility: CLINIC | Age: 65
End: 2025-05-28

## 2025-05-29 NOTE — Clinical Note
REFERRAL APPROVAL NOTICE         Sent on May 29, 2025                   Woody Valdovinos  [address]  [facility] [address]                   Dear Mr. Valdovinos,    After a careful review of the medical information and benefit coverage, [facility] has processed your referral. See below for additional details.    If applicable, you must be actively enrolled with your insurance for coverage of the authorized service. If you have any questions regarding your coverage, please contact your insurance directly.    REFERRAL INFORMATION   Referral #:  [id number]  Referred-To Provider    Referred-By Provider:  Otolaryngology    [facility] PA[facility]      [address]  [facility] [address]  [phone] [address] [facility] [address]  [phone]    Referral Start Date:  05/21/2025  Referral End Date:   05/21/2026             SCHEDULING  If you do not already have an appointment, please call [phone] to make an appointment.     MORE INFORMATION  If you do not already have a [employer] [facility] account, sign up at: [email or web address]  You can access your medical information, make appointments, see lab results, billing information, and more.  If you have questions regarding this referral, please contact  the [facility] Referrals department at:             [phone]. Monday - Friday 8:00AM - 5:00PM.     Sincerely,    [facility]

## 2025-05-30 ENCOUNTER — APPOINTMENT (OUTPATIENT)
Dept: RADIOLOGY | Facility: MEDICAL CENTER | Age: 65
End: 2025-05-30
Attending: PHYSICIAN ASSISTANT
Payer: COMMERCIAL

## 2025-06-02 NOTE — RESULT ENCOUNTER NOTE
Called ptTAMIA to let him know to make an appointment, and that we will add him to the wait list.

## 2025-06-04 ENCOUNTER — OFFICE VISIT (OUTPATIENT)
Dept: MEDICAL GROUP | Facility: CLINIC | Age: 65
End: 2025-06-04
Payer: COMMERCIAL

## 2025-06-04 VITALS
DIASTOLIC BLOOD PRESSURE: 80 MMHG | OXYGEN SATURATION: 97 % | TEMPERATURE: 99.3 F | HEART RATE: 65 BPM | RESPIRATION RATE: 16 BRPM | HEIGHT: 70 IN | BODY MASS INDEX: 30.8 KG/M2 | WEIGHT: 215.17 LBS | SYSTOLIC BLOOD PRESSURE: 124 MMHG

## 2025-06-04 DIAGNOSIS — E66.9 OBESITY (BMI 30-39.9): ICD-10-CM

## 2025-06-04 DIAGNOSIS — E78.5 HYPERLIPIDEMIA, MILD: ICD-10-CM

## 2025-06-04 DIAGNOSIS — E03.9 HYPOTHYROIDISM, UNSPECIFIED TYPE: Primary | ICD-10-CM

## 2025-06-04 DIAGNOSIS — H53.8 SEES FLASHES OF LIGHT: ICD-10-CM

## 2025-06-04 DIAGNOSIS — I10 HYPERTENSION, UNSPECIFIED TYPE: ICD-10-CM

## 2025-06-04 DIAGNOSIS — R42 VERTIGO: ICD-10-CM

## 2025-06-04 DIAGNOSIS — Z23 NEED FOR VACCINATION: ICD-10-CM

## 2025-06-04 PROBLEM — R73.03 PREDIABETES: Status: ACTIVE | Noted: 2025-05-21

## 2025-06-04 PROCEDURE — 90715 TDAP VACCINE 7 YRS/> IM: CPT | Performed by: PHYSICIAN ASSISTANT

## 2025-06-04 PROCEDURE — 99214 OFFICE O/P EST MOD 30 MIN: CPT | Mod: 25 | Performed by: PHYSICIAN ASSISTANT

## 2025-06-04 PROCEDURE — 3079F DIAST BP 80-89 MM HG: CPT | Performed by: PHYSICIAN ASSISTANT

## 2025-06-04 PROCEDURE — 90677 PCV20 VACCINE IM: CPT | Performed by: PHYSICIAN ASSISTANT

## 2025-06-04 PROCEDURE — 90472 IMMUNIZATION ADMIN EACH ADD: CPT | Performed by: PHYSICIAN ASSISTANT

## 2025-06-04 PROCEDURE — 90471 IMMUNIZATION ADMIN: CPT | Performed by: PHYSICIAN ASSISTANT

## 2025-06-04 PROCEDURE — 3074F SYST BP LT 130 MM HG: CPT | Performed by: PHYSICIAN ASSISTANT

## 2025-06-04 RX ORDER — LEVOTHYROXINE SODIUM 50 UG/1
50 TABLET ORAL
Qty: 90 TABLET | Refills: 0 | Status: SHIPPED | OUTPATIENT
Start: 2025-06-04

## 2025-06-04 ASSESSMENT — FIBROSIS 4 INDEX: FIB4 SCORE: 1.79

## 2025-06-04 NOTE — PROGRESS NOTES
cc:  thyroid    Subjective:     Woody Valdovinos is a 64 y.o. male presenting for thyroid        History of Present Illness  The patient presents to the office today to follow up with his most recent test results. He has a history of hypothyroidism but is not currently on any medication. His thyroid is in a hypostate at this time. His LDL is 123, A1c is 6.1, and fasting glucose is 118.    He was previously on thyroid medication, which he discontinued a long time ago. He recalls that his thyroid levels did not normalize even when he was on a 0.75 mcg dose of the medication.    He reports experiencing dizziness, describing it as a sensation akin to the room spinning, although no actual spinning occurs. He believes his symptoms are worsening over time. He has an upcoming appointment with an ENT specialist in 11/2025.    He has been managing prediabetes for the past 30 years. He underwent an eye examination, which included an extra scan and pupil dilation. The results showed no signs of retinal issues that could cause bright flashes, and his eyes were deemed healthy for his age. There were also no indications of diabetes in his eyes.    He experienced initial swelling in his feet during the first 3 days of amlodipine treatment, which subsequently resolved. His blood pressure readings were inconsistent initially but have since stabilized. He has abstained from alcohol since his last visit. He has been taking vitamin D supplements at a dosage of 10,000 units daily and works outdoors.    He has a long-standing history of migraines but reports that the frequency of bright flashes has decreased. He describes the flashes as similar to the visual disturbance experienced after a head impact.    SOCIAL HISTORY  - Has not had a drink since his last visit       Review of systems:  See above.   Denies any symptoms unless previously indicated.      Current Medications[1]    Allergies, past medical history, past surgical history,  "family history, social history reviewed and updated    Objective:     Vitals: /80 (BP Location: Right arm, Patient Position: Sitting, BP Cuff Size: Adult)   Pulse 65   Temp 37.4 °C (99.3 °F) (Temporal)   Resp 16   Ht 1.778 m (5' 10\")   Wt 97.6 kg (215 lb 2.7 oz)   SpO2 97%   BMI 30.87 kg/m²   General: Alert, pleasant, NAD  EYES:   PERRL, EOMI, no icterus or pallor.  Conjunctivae and lids normal.   HENT:  Normocephalic.  External ears normal. Neck supple.     Respiratory: Normal respiratory effort.    Abdomen: obese  Skin: Warm, dry, no rashes.  Musculoskeletal: Gait is normal.  Moves all extremities well.    Neurological: No tremors, sensation grossly intact, CN2-12 intact.  Psych:  Affect/mood is normal, judgement is good, memory is intact, grooming is appropriate.    Results     Latest Reference Range & Units 05/27/25 07:45   Sodium 135 - 145 mmol/L 140   Potassium 3.6 - 5.5 mmol/L 4.4   Chloride 96 - 112 mmol/L 107   Co2 20 - 33 mmol/L 22   Anion Gap 7.0 - 16.0  11.0   Glucose 65 - 99 mg/dL 118 (H)   Bun 8 - 22 mg/dL 19   Creatinine 0.50 - 1.40 mg/dL 1.24   GFR (CKD-EPI) >60 mL/min/1.73 m 2 65   Calcium 8.5 - 10.5 mg/dL 8.8   Correct Calcium 8.5 - 10.5 mg/dL 8.6   AST(SGOT) 12 - 45 U/L 31   ALT(SGPT) 2 - 50 U/L 37   Alkaline Phosphatase 30 - 99 U/L 64   Total Bilirubin 0.1 - 1.5 mg/dL 1.9 (H)   Albumin 3.2 - 4.9 g/dL 4.2   Total Protein 6.0 - 8.2 g/dL 7.3   Globulin 1.9 - 3.5 g/dL 3.1   A-G Ratio g/dL 1.4   Glycohemoglobin 4.0 - 5.6 % 6.1 (H)   Estim. Avg Glu mg/dL 128   Cholesterol,Tot 100 - 199 mg/dL 192   Triglycerides 0 - 149 mg/dL 117   HDL >=40 mg/dL 46   LDL <100 mg/dL 123 (H)   25-Hydroxy   Vitamin D 25 30 - 100 ng/mL 43   Prostatic Specific Antigen Tot 0.00 - 4.00 ng/mL 0.49   TSH 0.380 - 5.330 uIU/mL 8.890 (H)   Free T-4 0.93 - 1.70 ng/dL 0.75 (L)   (H): Data is abnormally high  (L): Data is abnormally low      Labs   - LDL: 123   - A1c: 6.1   - Fasting glucose: 118   - Bilirubin: Slightly " elevated   - Total cholesterol: 192   - Triglycerides: 117   - HDL: 46   - Vitamin D: Normal   - TSH: High, indicating low thyroid hormone production       Assessment/Plan:     Woody was seen today for lab results.    Diagnoses and all orders for this visit:    Hypothyroidism, unspecified type  -     levothyroxine (SYNTHROID) 50 MCG Tab; Take 1 Tablet by mouth every morning on an empty stomach.  -     TSH+FREE T4    Hypertension, unspecified type    Hyperlipidemia, mild    Sees flashes of light    Vertigo    Need for vaccination  -     Pneumococcal Conjugate Vaccine 20-Valent (6 wks+)  -     Tdap Vaccine =>6YO IM        Assessment & Plan  1. Hypothyroidism.  - Thyroid is currently in a hypostate, and there is a history of hypothyroidism without current medication.  - Symptoms such as dizziness may be related to low thyroid hormone levels.  - Thyroid hormone replacement therapy will be initiated with levothyroxine 50 mcg daily.  - A 90-day supply with no refills will be sent to the pharmacy. Labs will be rechecked in 6 to 8 weeks to assess the need for dosage adjustment.    2. Hypertension.  - Blood pressure is well-controlled with the current regimen.  - Dizziness may be related to blood pressure medication or rapid changes in blood pressure.  - Continue with amlodipine 5 mg as prescribed.  - Advised to avoid alcohol consumption to prevent increasing blood pressure.    3. Prediabetes/obesity.  - A1c is 6.1, placing him in the prediabetes range.  - Fasting glucose has improved from a year ago.  - Advised to continue monitoring blood sugar levels and maintain a balanced diet and regular exercise.  - No additional labs ordered at this time.    4. Elevated LDL cholesterol.  - LDL is 123, which is higher than desired but stable compared to previous readings.  - Elevated LDL may be related to hypothyroidism.  - Improvement in LDL levels is expected once thyroid function is better managed.  - No immediate changes to  current management.    5. Health Maintenance.  - Kidney function and prostate screening tests are normal.  - Bilirubin is slightly elevated but not a concern given his history.  - Vitamin D levels are normal.  - Tetanus and pneumonia vaccines will be administered today. Tetanus vaccine will be good for 10 years, and the pneumonia vaccine will be good until age 65, with two more doses after age 65.    6. Migraines.  - Long-standing history of migraines with decreased frequency of bright flashes.  - Describes flashes as similar to visual disturbance after a head impact.  - MRI of the brain ordered to investigate further.  - Referral to neurology will be considered based on MRI results.    Follow-up  - Follow-up appointment scheduled in 6 weeks to review labs and imaging results.    Return in about 6 weeks (around 7/16/2025), or if symptoms worsen or fail to improve, for 6-8 weeks.  MRI and labs.    Please note that this dictation was created using voice recognition software. I have made every reasonable attempt to correct obvious errors, but expect that there are errors of grammar and possible content that I did not discover before finalizing note.               [1]   Current Outpatient Medications:     levothyroxine (SYNTHROID) 50 MCG Tab, Take 1 Tablet by mouth every morning on an empty stomach., Disp: 90 Tablet, Rfl: 0    amLODIPine (NORVASC) 5 MG Tab, Take 1 Tablet by mouth every day., Disp: 100 Tablet, Rfl: 3    vitamin D2, Ergocalciferol, (DRISDOL) 1.25 MG (71035 UT) Cap capsule, TAKE 1 CAPSULE BY MOUTH ONE TIME PER WEEK, Disp: 4 Capsule, Rfl: 0    aspirin (ASA) 325 MG Tab, Take 650 mg by mouth one time. (Patient not taking: Reported on 6/4/2025), Disp: , Rfl:     sildenafil citrate (VIAGRA) 50 MG tablet, Take 50 mg by mouth 1 time a day as needed for Erectile Dysfunction. (Patient not taking: Reported on 6/4/2025), Disp: , Rfl:     CITRULLINE PO, Take 1 Dose by mouth two times a week. (Patient not taking:  Reported on 6/4/2025), Disp: , Rfl:

## 2025-06-04 NOTE — LETTER
[employer] [facility]  Francy Boland P.A.-C.  [address]  [facility] [address]  Fax: [phone]   Authorization for Release/Disclosure of   Protected Health Information   Name: YOEL ANDREWS : 1960 SSN: [id number]   Address: [address] [address] Phone:    [phone] (home)    I authorize the entity listed below to release/disclose the PHI below to:   [facility]/Francy Boland P.A.-C. and Francy Boland P.A.-C.   Provider or Entity Name: [facility]      Address   City, State, Zip   Phone:      Fax:     Reason for request: continuity of care   Information to be released:    [xx  ] LAST COLONOSCOPY,  including any PATH REPORT and follow-up  [  ] LAST FIT/COLOGUARD RESULT [  ] LAST DEXA  [  ] LAST MAMMOGRAM  [  ] LAST PAP  [  ] LAST LABS [  ] RETINA EXAM REPORT  [  ] IMMUNIZATION RECORDS  [  ] Release all info      [  ] Check here and initial the line next to each item to release ALL health information INCLUDING  _____ Care and treatment for drug and / or alcohol abuse  _____ HIV testing, infection status, or AIDS  _____ Genetic Testing    DATES OF SERVICE OR TIME PERIOD TO BE DISCLOSED: _____________  I understand and acknowledge that:  * This Authorization may be revoked at any time by you in writing, except if your health information has already been used or disclosed.  * Your health information that will be used or disclosed as a result of you signing this authorization could be re-disclosed by the recipient. If this occurs, your re-disclosed health information may no longer be protected by State or Federal laws.  * You may refuse to sign this Authorization. Your refusal will not affect your ability to obtain treatment.  * This Authorization becomes effective upon signing and will  on (date) __________.      If no date is indicated, this Authorization will  one (1) year from the signature date.    Name: Yoel Andrews  Signature: Date:   2025     PLEASE FAX  REQUESTED RECORDS BACK TO: [phone]

## 2025-06-26 ENCOUNTER — NON-PROVIDER VISIT (OUTPATIENT)
Dept: MEDICAL GROUP | Facility: CLINIC | Age: 65
End: 2025-06-26
Payer: COMMERCIAL

## 2025-06-26 ENCOUNTER — HOSPITAL ENCOUNTER (OUTPATIENT)
Facility: MEDICAL CENTER | Age: 65
End: 2025-06-26
Attending: PHYSICIAN ASSISTANT
Payer: COMMERCIAL

## 2025-06-26 LAB
T4 FREE SERPL-MCNC: 1.05 NG/DL (ref 0.93–1.7)
TSH SERPL-ACNC: 5.57 UIU/ML (ref 0.38–5.33)

## 2025-06-26 PROCEDURE — 84439 ASSAY OF FREE THYROXINE: CPT

## 2025-06-26 PROCEDURE — 84443 ASSAY THYROID STIM HORMONE: CPT

## 2025-06-26 NOTE — PROGRESS NOTES
Woody Valdovinos is a 64 y.o. male here for a non-provider visit for a lab draw on 6/26/2025 at 8:21 AM.    Procedure performed:  Venipuncture     Anatomical site:  Right Antecubital Area    Equipment used:  21 g butterfly     Labs drawn:  TSH    Ordering provider:  Francy Boland PAC    Lab draw completed by:  Gerard Palma Ass't

## 2025-06-30 ENCOUNTER — OFFICE VISIT (OUTPATIENT)
Dept: MEDICAL GROUP | Facility: CLINIC | Age: 65
End: 2025-06-30
Payer: COMMERCIAL

## 2025-06-30 VITALS
OXYGEN SATURATION: 96 % | RESPIRATION RATE: 16 BRPM | BODY MASS INDEX: 30.3 KG/M2 | SYSTOLIC BLOOD PRESSURE: 136 MMHG | DIASTOLIC BLOOD PRESSURE: 84 MMHG | HEART RATE: 80 BPM | HEIGHT: 70 IN | WEIGHT: 211.64 LBS | TEMPERATURE: 98.7 F

## 2025-06-30 DIAGNOSIS — E03.9 HYPOTHYROIDISM, UNSPECIFIED TYPE: ICD-10-CM

## 2025-06-30 DIAGNOSIS — I10 HYPERTENSION, UNSPECIFIED TYPE: ICD-10-CM

## 2025-06-30 DIAGNOSIS — D35.2 PITUITARY ADENOMA (HCC): Primary | ICD-10-CM

## 2025-06-30 DIAGNOSIS — Z12.11 COLON CANCER SCREENING: ICD-10-CM

## 2025-06-30 DIAGNOSIS — I73.9 SMALL VESSEL DISEASE (HCC): ICD-10-CM

## 2025-06-30 ASSESSMENT — FIBROSIS 4 INDEX: FIB4 SCORE: 1.79

## 2025-06-30 NOTE — PROGRESS NOTES
"cc:  MRI results    Subjective:     Woody Valdovinos is a 64 y.o. male presenting for MRI results        History of Present Illness  The patient is a 64-year-old male who presents to the office today to follow up with test results. He had thyroid testing done on 06/26/2025, but it is somewhat early as he had abnormal labs on 05/27/2025, and there needs to be at least 6 weeks difference. He also had an MRI of the brain for dizziness, tremor, and visual disturbances. The MRI indicated a possible microadenoma of 7 mm in the pituitary gland and recommends a dedicated MRI of the pituitary gland for further evaluation. It also indicates moderate cerebral atrophy with moderate to severe chronic small vessel ischemic disease. The MRI was done with [employer] at 0000 hours. He is a type 2 diabetic with hypothyroidism, hypertension, and hyperlipidemia. He is also due for a colon cancer screening. He does have questions regarding his most recent test results.         Patient declines recording for note.    Patient states that he has been to see pain management and states that he had an MRI of the cervical and lumbar spine.  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  He has known individuals who have fallen with a statin.  He states that this concerns with him the potential of falling.     Review of systems:  See above.   Denies any symptoms unless previously indicated.      Current Medications[1]    Allergies, past medical history, past surgical history, family history, social history reviewed and updated    Objective:     Vitals: /84 (BP Location: Left arm, Patient Position: Sitting, BP Cuff Size: Large adult)   Pulse 80   Temp 37.1 °C (98.7 °F) (Temporal)   Resp 16   Ht 1.778 m (5' 10\")   Wt 96 kg (211 lb 10.3 oz)   SpO2 96%   BMI 30.37 kg/m²   General: Alert, pleasant, NAD  EYES:   PERRL, EOMI, no icterus or pallor.  Conjunctivae and lids " normal.   HENT:  Normocephalic.  External ears normal. Tympanic membranes pearly, opaque.   Neck supple.    Respiratory: Normal respiratory effort.    Abdomen: obese  Skin: Warm, dry, no rashes.  Musculoskeletal: Gait is normal.  Moves all extremities well.    Neurological: No tremors, sensation grossly intact, CN2-12 intact.  Psych:  Affect/mood is normal, judgement is good, memory is intact, grooming is appropriate.      Results   Latest Reference Range & Units 06/26/25 08:10   TSH 0.380 - 5.330 uIU/mL 5.570 (H)   Free T-4 0.93 - 1.70 ng/dL 1.05   (H): Data is abnormally high      Laboratory Studies  Thyroid testing done on 06/26/2025.    Imaging  MRI of the brain indicated a possible microadenoma 7 mm of the pituitary gland, moderate cerebral atrophy, and moderate to severe chronic small vessel ischemic disease.       Assessment/Plan:     Woody was seen today for results.    Diagnoses and all orders for this visit:    Pituitary adenoma (HCC)  -     MR-BRAIN PITUITARY W/WO; Future  -     Referral to Endocrinology    Small vessel disease (HCC)  -     Referral to Neurology    Hypothyroidism, unspecified type  -     TSH WITH REFLEX TO FT4; Future    Colon cancer screening  -     Referral to GI for Colonoscopy    Hypertension, unspecified type        Assessment & Plan  1. Pituitary adenoma.  The MRI results were discussed, indicating a possible microadenoma of 7 mm in the pituitary gland. A dedicated MRI of the pituitary gland will be obtained for further evaluation. Referrals to neurology and endocrinology will be made.    2. Small vessel ischemic disease.  The MRI indicated moderate to severe chronic small vessel ischemic disease. Cholesterol medication was discussed, but he declined at this time. The risks were explained.    3. Hypothyroidism.  As the labs were drawn early, repeat labs will be conducted in the next 2 to 6 weeks. Medications will be adjusted accordingly.    4. Hypertension.  His condition is stable,  and he is tolerating the medication well. Continue to monitor.    5.  Health maintenance  Colonosocpy ordered.     No follow-ups on file.    Please note that this dictation was created using voice recognition software. I have made every reasonable attempt to correct obvious errors, but expect that there are errors of grammar and possible content that I did not discover before finalizing note.               [1]   Current Outpatient Medications:     levothyroxine (SYNTHROID) 50 MCG Tab, Take 1 Tablet by mouth every morning on an empty stomach., Disp: 90 Tablet, Rfl: 0    amLODIPine (NORVASC) 5 MG Tab, Take 1 Tablet by mouth every day., Disp: 100 Tablet, Rfl: 3    vitamin D2, Ergocalciferol, (DRISDOL) 1.25 MG (47371 UT) Cap capsule, TAKE 1 CAPSULE BY MOUTH ONE TIME PER WEEK, Disp: 4 Capsule, Rfl: 0    aspirin (ASA) 325 MG Tab, Take 650 mg by mouth one time. (Patient not taking: Reported on 6/30/2025), Disp: , Rfl:     sildenafil citrate (VIAGRA) 50 MG tablet, Take 50 mg by mouth 1 time a day as needed for Erectile Dysfunction. (Patient not taking: Reported on 6/30/2025), Disp: , Rfl:     CITRULLINE PO, Take 1 Dose by mouth two times a week. (Patient not taking: Reported on 6/30/2025), Disp: , Rfl:

## 2025-06-30 NOTE — LETTER
[facility]  Francy Boland P.A.-C.  [address]  [facility] [address]  Fax: [phone]   Authorization for Release/Disclosure of   Protected Health Information   Name: YOEL ANDREWS : 1960 SSN: [id number]   Address: [address] [address] Phone:    [phone] (home)    I authorize the entity listed below to release/disclose the PHI below to:   [facility]Francy Boland P.A.-C. and Francy Boland P.A.-C.   Provider or Entity Name:  [facility]     Address   City, State, Zip   Phone:      Fax:     Reason for request: continuity of care   Information to be released:    [  ] LAST COLONOSCOPY,  including any PATH REPORT and follow-up  [  ] LAST FIT/COLOGUARD RESULT [  ] LAST DEXA  [  ] LAST MAMMOGRAM  [  ] LAST PAP  [  ] LAST LABS [  ] RETINA EXAM REPORT  [  ] IMMUNIZATION RECORDS  [ x ] Release all info MRI lumbar and cervical spine      [  ] Check here and initial the line next to each item to release ALL health information INCLUDING  _____ Care and treatment for drug and / or alcohol abuse  _____ HIV testing, infection status, or AIDS  _____ Genetic Testing    DATES OF SERVICE OR TIME PERIOD TO BE DISCLOSED: _____________  I understand and acknowledge that:  * This Authorization may be revoked at any time by you in writing, except if your health information has already been used or disclosed.  * Your health information that will be used or disclosed as a result of you signing this authorization could be re-disclosed by the recipient. If this occurs, your re-disclosed health information may no longer be protected by State or Federal laws.  * You may refuse to sign this Authorization. Your refusal will not affect your ability to obtain treatment.  * This Authorization becomes effective upon signing and will  on (date) __________.      If no date is indicated, this Authorization will  one (1) year from the signature date.    Name: Yoel Andrews  Signature:  Date:   6/30/2025     PLEASE FAX REQUESTED RECORDS BACK TO: [phone]

## 2025-07-05 ENCOUNTER — APPOINTMENT (OUTPATIENT)
Dept: RADIOLOGY | Facility: MEDICAL CENTER | Age: 65
End: 2025-07-05
Attending: PHYSICIAN ASSISTANT
Payer: COMMERCIAL

## 2025-07-08 NOTE — Clinical Note
REFERRAL APPROVAL NOTICE         Sent on July 8, 2025                   Woody Valdovinos  [address]  [facility] [address]                   Dear Mr. Valdovinos,    After a careful review of the medical information and benefit coverage, [facility] has processed your referral. See below for additional details.    If applicable, you must be actively enrolled with your insurance for coverage of the authorized service. If you have any questions regarding your coverage, please contact your insurance directly.    REFERRAL INFORMATION   Referral #:  [id number]  Referred-To Department    Referred-By Provider:  Endocrinology    Francy Boland P.A.-C.   Endocrinology [facility]      [address] [address]  [address]  [facility] [id number] [phone] [address] [facility], [address]  [facility] [address] [phone]    Referral Start Date:  06/30/2025  Referral End Date:   06/30/2026           SCHEDULING  If you do not already have an appointment, please call [phone] to make an appointment.   MORE INFORMATION  As a reminder, [facility] ownership has changed, meaning this location is now owned and operated by [facility]. As such, we want to clarify that our patients should expect to receive two separate bills for the services received at [facility] - one representing the [facility] facility fees as the owner of the establishment, and the other to represent the physician's services and subsequent fees. You can speak with your insurance carrier for a pricing estimate by calling the customer service number on the back of your card and ask about charges for a hospital outpatient visit.  If you do not already have a [employer] account, sign up at: [email or web address]  You can access your medical information, make appointments, see lab results, billing information, and more.  If you have questions regarding this referral, please contact  the [facility] Referrals department at:              [phone]. Monday - Friday 7:30AM - 5:00PM.      Sincerely,  [facility]

## 2025-07-08 NOTE — Clinical Note
REFERRAL APPROVAL NOTICE         Sent on July 8, 2025                   Woody Valdovinos  [address]  [facility] [address]                   Dear Mr. Valdovinos,    After a careful review of the medical information and benefit coverage, [facility] has processed your referral. See below for additional details.    If applicable, you must be actively enrolled with your insurance for coverage of the authorized service. If you have any questions regarding your coverage, please contact your insurance directly.    REFERRAL INFORMATION   Referral #:  [id number]  Referred-To Provider    Referred-By Provider:  Gastroenterology    Francy Boland P.A.-C.   [facility]      [facility] [address] [id number] [phone] [address] [place] [address]  [phone]    Referral Start Date:  06/30/2025  Referral End Date:   06/30/2026             SCHEDULING  If you do not already have an appointment, please call [phone] to make an appointment.     MORE INFORMATION  If you do not already have a [employer] account, sign up at: [email or web address]  You can access your medical information, make appointments, see lab results, billing information, and more.  If you have questions regarding this referral, please contact  the [facility] Referrals department at:             [phone]. Monday - Friday 8:00AM - 5:00PM.     Sincerely,    [facility]

## 2025-07-10 ENCOUNTER — TELEPHONE (OUTPATIENT)
Dept: HEALTH INFORMATION MANAGEMENT | Facility: OTHER | Age: 65
End: 2025-07-10
Payer: COMMERCIAL

## 2025-07-10 DIAGNOSIS — D35.2 PITUITARY ADENOMA (HCC): Primary | ICD-10-CM

## 2025-07-16 ENCOUNTER — NON-PROVIDER VISIT (OUTPATIENT)
Dept: MEDICAL GROUP | Facility: CLINIC | Age: 65
End: 2025-07-16
Payer: COMMERCIAL

## 2025-07-16 ENCOUNTER — HOSPITAL ENCOUNTER (OUTPATIENT)
Facility: MEDICAL CENTER | Age: 65
End: 2025-07-16
Attending: PHYSICIAN ASSISTANT
Payer: COMMERCIAL

## 2025-07-16 DIAGNOSIS — E03.9 HYPOTHYROIDISM, UNSPECIFIED TYPE: ICD-10-CM

## 2025-07-16 DIAGNOSIS — D35.2 PITUITARY ADENOMA (HCC): ICD-10-CM

## 2025-07-16 LAB
PROLACTIN SERPL-MCNC: 17 NG/ML (ref 2.1–17.7)
TSH SERPL DL<=0.005 MIU/L-ACNC: 4.98 UIU/ML (ref 0.38–5.33)

## 2025-07-16 PROCEDURE — 84443 ASSAY THYROID STIM HORMONE: CPT

## 2025-07-16 PROCEDURE — 84146 ASSAY OF PROLACTIN: CPT

## 2025-07-16 PROCEDURE — 36415 COLL VENOUS BLD VENIPUNCTURE: CPT | Performed by: PHYSICIAN ASSISTANT

## 2025-07-16 NOTE — PROGRESS NOTES
Woody Valdovinos is a 64 y.o. male here for a non-provider visit for a lab draw on 7/16/2025 at 7:37 AM.    Procedure performed:  Venipuncture     Anatomical site:  Right Antecubital Area    Equipment used:  21 g vacutainer     Labs drawn:  TSH and Prolactin    Ordering provider:  Francy Boland    Lab draw completed by:  Kristina Christensen, Med Ass't

## 2025-07-21 ENCOUNTER — HOSPITAL ENCOUNTER (OUTPATIENT)
Facility: MEDICAL CENTER | Age: 65
End: 2025-07-21
Attending: PHYSICIAN ASSISTANT
Payer: COMMERCIAL

## 2025-07-21 ENCOUNTER — OFFICE VISIT (OUTPATIENT)
Dept: MEDICAL GROUP | Facility: CLINIC | Age: 65
End: 2025-07-21
Payer: COMMERCIAL

## 2025-07-21 VITALS
HEIGHT: 71 IN | OXYGEN SATURATION: 96 % | HEART RATE: 84 BPM | SYSTOLIC BLOOD PRESSURE: 140 MMHG | BODY MASS INDEX: 30.59 KG/M2 | TEMPERATURE: 98.2 F | DIASTOLIC BLOOD PRESSURE: 82 MMHG | WEIGHT: 218.48 LBS | RESPIRATION RATE: 16 BRPM

## 2025-07-21 DIAGNOSIS — R35.0 BENIGN PROSTATIC HYPERPLASIA WITH URINARY FREQUENCY: ICD-10-CM

## 2025-07-21 DIAGNOSIS — N40.1 BENIGN PROSTATIC HYPERPLASIA WITH URINARY FREQUENCY: ICD-10-CM

## 2025-07-21 DIAGNOSIS — E03.9 HYPOTHYROIDISM, UNSPECIFIED TYPE: ICD-10-CM

## 2025-07-21 DIAGNOSIS — D35.2 PITUITARY ADENOMA (HCC): Primary | ICD-10-CM

## 2025-07-21 DIAGNOSIS — M45.9 ANKYLOSING SPONDYLITIS, UNSPECIFIED SITE OF SPINE (HCC): ICD-10-CM

## 2025-07-21 DIAGNOSIS — R35.1 FREQUENT URINATION AT NIGHT: ICD-10-CM

## 2025-07-21 PROCEDURE — 99214 OFFICE O/P EST MOD 30 MIN: CPT | Performed by: PHYSICIAN ASSISTANT

## 2025-07-21 PROCEDURE — 3079F DIAST BP 80-89 MM HG: CPT | Performed by: PHYSICIAN ASSISTANT

## 2025-07-21 PROCEDURE — 3077F SYST BP >= 140 MM HG: CPT | Performed by: PHYSICIAN ASSISTANT

## 2025-07-21 PROCEDURE — 84305 ASSAY OF SOMATOMEDIN: CPT

## 2025-07-21 RX ORDER — LEVOTHYROXINE SODIUM 75 UG/1
75 TABLET ORAL
Qty: 90 TABLET | Refills: 1 | Status: SHIPPED | OUTPATIENT
Start: 2025-07-21

## 2025-07-21 RX ORDER — TADALAFIL 5 MG/1
5 TABLET ORAL PRN
Qty: 90 TABLET | Refills: 3 | Status: SHIPPED | OUTPATIENT
Start: 2025-07-21

## 2025-07-21 ASSESSMENT — FIBROSIS 4 INDEX: FIB4 SCORE: 1.79

## 2025-07-21 NOTE — PROGRESS NOTES
"cc:  pituitary adenoma    Subjective:     Woody Valdovinos is a 64 y.o. male presenting for pituitary adenoma      History of Present Illness  The patient is a 64-year-old male who presents to the office today to discuss his lab results. He was found to have a pituitary adenoma, and further labs were drawn, including thyroid and prolactin levels, which were normal. However, IGF was not tested, and he is willing to have this drawn in the office today.    He is currently on 50 mcg of levothyroxine. Although his thyroid levels have improved, they remain borderline high. He reports increased fatigue and weight gain and is hopeful for a dosage adjustment.    He reports symptoms of benign prostatic hyperplasia (BPH), including frequent urination. He has been taking Viagra daily, sourced from [place], and is considering starting Cialis 5 mg for his BPH symptoms.    He has been diagnosed with ankylosing spondylitis by a pain management specialist. He is currently taking diclofenac for his back pain, which he reports as being very effective. He is questioning whether he should continue taking aspirin while on diclofenac.       Review of systems:  See above.   Denies any symptoms unless previously indicated.      Current Medications[1]    Allergies, past medical history, past surgical history, family history, social history reviewed and updated    Objective:     Vitals: BP (!) 140/82 (BP Location: Left arm, Patient Position: Sitting, BP Cuff Size: Large adult)   Pulse 84   Temp 36.8 °C (98.2 °F) (Temporal)   Resp 16   Ht 1.803 m (5' 11\")   Wt 99.1 kg (218 lb 7.6 oz)   SpO2 96%   BMI 30.47 kg/m²   General: Alert, pleasant, NAD  EYES:   PERRL, EOMI, no icterus or pallor.  Conjunctivae and lids normal.   HENT:  Normocephalic.  External ears normal.  Neck supple.     Respiratory: Normal respiratory effort.   Abdomen: obese.  Skin: Warm, dry, no rashes.  Musculoskeletal: Gait is normal.  Moves all extremities well.  "   Neurological: No tremors, sensation grossly intact,  CN2-12 intact.  Psych:  Affect/mood is normal, judgement is good, memory is intact, grooming is appropriate.       Results  Labs   - Thyroid testing: Normal   - Prolactin level: Normal      Latest Reference Range & Units 07/16/25 07:31   TSH 0.380 - 5.330 uIU/mL 4.980   Prolactin 2.10 - 17.70 ng/mL 17.00       Assessment/Plan:     Woody was seen today for lab results.    Diagnoses and all orders for this visit:    Pituitary adenoma (HCC)    Hypothyroidism, unspecified type  -     levothyroxine (SYNTHROID) 75 MCG Tab; Take 1 Tablet by mouth every morning on an empty stomach.  -     TSH WITH REFLEX TO FT4; Future    Frequent urination at night  -     tadalafil (CIALIS) 5 MG tablet; Take 1 Tablet by mouth as needed for Erectile Dysfunction.    Benign prostatic hyperplasia with urinary frequency  -     tadalafil (CIALIS) 5 MG tablet; Take 1 Tablet by mouth as needed for Erectile Dysfunction.    Ankylosing spondylitis, unspecified site of spine (HCC)        Assessment & Plan  1. Pituitary adenoma.  - IGF level will be obtained today as it was not included in previous labs.  - Thyroid and prolactin levels are normal.  - Patient informed that IGF results will take approximately 2 weeks to be processed.  - Thyroid testing ordered but not to be done today.    2. Hypothyroidism.  - Condition is stable but patient is still symptomatic.  - Increased fatigue and weight gain reported.  - Levothyroxine dosage will be increased from 50 mcg to 75 mcg.  - Repeat labs will be conducted in 6 to 8 weeks.    3. Frequent urination/BPH.  - Patient reports frequent urination.  - Tadalafil 5 mg daily will be prescribed.    4. Ankylosing spondylitis.  - Patient currently on diclofenac, which has been effective.  - Aspirin will be discontinued for now.    No follow-ups on file.    Please note that this dictation was created using voice recognition software. I have made every reasonable  attempt to correct obvious errors, but expect that there are errors of grammar and possible content that I did not discover before finalizing note.               [1]   Current Outpatient Medications:     diclofenac DR (VOLTAREN) 50 MG Tablet Delayed Response, , Disp: , Rfl:     tizanidine (ZANAFLEX) 4 MG Tab, , Disp: , Rfl:     levothyroxine (SYNTHROID) 75 MCG Tab, Take 1 Tablet by mouth every morning on an empty stomach., Disp: 90 Tablet, Rfl: 1    tadalafil (CIALIS) 5 MG tablet, Take 1 Tablet by mouth as needed for Erectile Dysfunction., Disp: 90 Tablet, Rfl: 3    amLODIPine (NORVASC) 5 MG Tab, Take 1 Tablet by mouth every day., Disp: 100 Tablet, Rfl: 3    vitamin D2, Ergocalciferol, (DRISDOL) 1.25 MG (81171 UT) Cap capsule, TAKE 1 CAPSULE BY MOUTH ONE TIME PER WEEK, Disp: 4 Capsule, Rfl: 0    aspirin (ASA) 325 MG Tab, Take 650 mg by mouth one time. (Patient not taking: Reported on 7/21/2025), Disp: , Rfl:     CITRULLINE PO, Take 1 Dose by mouth two times a week. (Patient not taking: Reported on 7/21/2025), Disp: , Rfl:

## 2025-07-23 ENCOUNTER — RESULTS FOLLOW-UP (OUTPATIENT)
Dept: MEDICAL GROUP | Facility: CLINIC | Age: 65
End: 2025-07-23
Payer: COMMERCIAL

## 2025-07-23 LAB
IGF-I SERPL-MCNC: 65 NG/ML (ref 43–225)
IGF-I Z-SCORE SERPL: -1.6

## 2025-08-21 ENCOUNTER — HOSPITAL ENCOUNTER (OUTPATIENT)
Facility: MEDICAL CENTER | Age: 65
End: 2025-08-21
Attending: PHYSICAL MEDICINE & REHABILITATION
Payer: COMMERCIAL

## 2025-08-21 ENCOUNTER — NON-PROVIDER VISIT (OUTPATIENT)
Dept: MEDICAL GROUP | Facility: CLINIC | Age: 65
End: 2025-08-21
Payer: COMMERCIAL

## 2025-08-21 PROCEDURE — 86038 ANTINUCLEAR ANTIBODIES: CPT

## 2025-08-21 PROCEDURE — 36415 COLL VENOUS BLD VENIPUNCTURE: CPT | Performed by: PHYSICIAN ASSISTANT

## 2025-08-21 PROCEDURE — 86431 RHEUMATOID FACTOR QUANT: CPT

## 2025-08-21 PROCEDURE — 86141 C-REACTIVE PROTEIN HS: CPT

## 2025-08-21 PROCEDURE — 85652 RBC SED RATE AUTOMATED: CPT

## 2025-08-22 ENCOUNTER — OFFICE VISIT (OUTPATIENT)
Dept: ENDOCRINOLOGY | Facility: MEDICAL CENTER | Age: 65
End: 2025-08-22
Attending: INTERNAL MEDICINE
Payer: COMMERCIAL

## 2025-08-22 VITALS
DIASTOLIC BLOOD PRESSURE: 87 MMHG | OXYGEN SATURATION: 94 % | SYSTOLIC BLOOD PRESSURE: 142 MMHG | HEART RATE: 92 BPM | BODY MASS INDEX: 31.78 KG/M2 | HEIGHT: 70 IN | WEIGHT: 222 LBS

## 2025-08-22 DIAGNOSIS — E03.9 PRIMARY HYPOTHYROIDISM: ICD-10-CM

## 2025-08-22 DIAGNOSIS — E55.9 VITAMIN D DEFICIENCY: ICD-10-CM

## 2025-08-22 DIAGNOSIS — E23.0 HYPOGONADOTROPIC HYPOGONADISM (HCC): ICD-10-CM

## 2025-08-22 DIAGNOSIS — D35.2 PITUITARY ADENOMA (HCC): Primary | ICD-10-CM

## 2025-08-22 LAB
CRP SERPL HS-MCNC: 0.5 MG/L (ref 0–3)
ERYTHROCYTE [SEDIMENTATION RATE] IN BLOOD BY WESTERGREN METHOD: 2 MM/HOUR (ref 0–20)
RHEUMATOID FACT SER IA-ACNC: <10 IU/ML (ref 0–14)

## 2025-08-22 PROCEDURE — 99213 OFFICE O/P EST LOW 20 MIN: CPT | Performed by: INTERNAL MEDICINE

## 2025-08-22 PROCEDURE — 3079F DIAST BP 80-89 MM HG: CPT | Performed by: INTERNAL MEDICINE

## 2025-08-22 PROCEDURE — 3077F SYST BP >= 140 MM HG: CPT | Performed by: INTERNAL MEDICINE

## 2025-08-22 PROCEDURE — 99205 OFFICE O/P NEW HI 60 MIN: CPT | Performed by: INTERNAL MEDICINE

## 2025-08-22 RX ORDER — DEXAMETHASONE 1 MG
1 TABLET ORAL
Qty: 2 TABLET | Refills: 0 | Status: SHIPPED | OUTPATIENT
Start: 2025-08-22

## 2025-08-22 RX ORDER — TESTOSTERONE CYPIONATE 200 MG/ML
100 INJECTION, SOLUTION INTRAMUSCULAR
Qty: 4 ML | Refills: 5 | Status: SHIPPED | OUTPATIENT
Start: 2025-08-22 | End: 2025-09-19

## 2025-08-22 ASSESSMENT — FIBROSIS 4 INDEX: FIB4 SCORE: 1.82

## 2025-08-23 LAB — NUCLEAR IGG SER QL IA: NORMAL
